# Patient Record
Sex: FEMALE | Race: WHITE | NOT HISPANIC OR LATINO | URBAN - METROPOLITAN AREA
[De-identification: names, ages, dates, MRNs, and addresses within clinical notes are randomized per-mention and may not be internally consistent; named-entity substitution may affect disease eponyms.]

---

## 2019-11-26 ENCOUNTER — OFFICE VISIT (OUTPATIENT)
Dept: ENDOCRINOLOGY | Facility: CLINIC | Age: 29
End: 2019-11-26
Payer: COMMERCIAL

## 2019-11-26 VITALS
BODY MASS INDEX: 30.73 KG/M2 | HEART RATE: 60 BPM | DIASTOLIC BLOOD PRESSURE: 60 MMHG | SYSTOLIC BLOOD PRESSURE: 110 MMHG | WEIGHT: 162.8 LBS | HEIGHT: 61 IN

## 2019-11-26 DIAGNOSIS — L68.0 HIRSUTISM: ICD-10-CM

## 2019-11-26 DIAGNOSIS — L65.9 HAIR LOSS: ICD-10-CM

## 2019-11-26 DIAGNOSIS — E28.2 PCOS (POLYCYSTIC OVARIAN SYNDROME): Primary | ICD-10-CM

## 2019-11-26 DIAGNOSIS — E06.3 HASHIMOTO'S THYROIDITIS: ICD-10-CM

## 2019-11-26 PROCEDURE — 99244 OFF/OP CNSLTJ NEW/EST MOD 40: CPT | Performed by: INTERNAL MEDICINE

## 2019-11-26 RX ORDER — SPIRONOLACTONE 100 MG/1
TABLET, FILM COATED ORAL
Refills: 0 | COMMUNITY
Start: 2019-11-19 | End: 2020-04-13 | Stop reason: SDUPTHER

## 2019-11-26 RX ORDER — NICOTINE POLACRILEX 2 MG
1 GUM BUCCAL DAILY
COMMUNITY

## 2019-11-26 RX ORDER — NORETHINDRONE AND ETHINYL ESTRADIOL 1 MG-35MCG
KIT ORAL
Refills: 0 | COMMUNITY
Start: 2019-11-05 | End: 2019-11-26

## 2019-11-26 RX ORDER — OMEGA-3S/DHA/EPA/FISH OIL/D3 300MG-1000
400 CAPSULE ORAL DAILY
COMMUNITY

## 2019-11-26 RX ORDER — CHLORAL HYDRATE 500 MG
1000 CAPSULE ORAL DAILY
COMMUNITY

## 2019-11-26 RX ORDER — LANOLIN ALCOHOL/MO/W.PET/CERES
325 CREAM (GRAM) TOPICAL
COMMUNITY
End: 2020-06-01 | Stop reason: ALTCHOICE

## 2019-11-26 RX ORDER — RIBOFLAVIN (VITAMIN B2) 100 MG
100 TABLET ORAL DAILY
COMMUNITY

## 2019-11-26 RX ORDER — SPIRONOLACTONE 50 MG/1
TABLET, FILM COATED ORAL
Refills: 0 | COMMUNITY
Start: 2019-11-19 | End: 2020-04-13 | Stop reason: SDUPTHER

## 2019-11-26 NOTE — PROGRESS NOTES
Assessment/Plan:    PCOS (polycystic ovarian syndrome)  Check total and free testosterone level  We did talk about birth control  Since she does not have a uterus, she does not need progesterone  We also discussed changing oral estradiol to patch which she is agreeable to  I prescribed estradiol patch 0 1 mg per 24 hour  Hashimoto's thyroiditis  Check TSH and free T4  Hirsutism  Continue spironolactone  Check BMP to make sure she does not have electrolyte disturbance  Hair loss  The possibilities include vitamin-D deficiency and iron deficiency  Check 25 hydroxy vitamin-D level and iron panel  Diagnoses and all orders for this visit:    PCOS (polycystic ovarian syndrome)  -     Testosterone, free, total Lab Collect; Future  -     Basic metabolic panel Lab Collect; Future  -     estradiol (CLIMARA) 0 1 mg/24 hr; Place 1 patch on the skin once a week    Hair loss  -     Vitamin D 25 hydroxy Lab Collect; Future  -     Iron Panel (Includes Ferritin, Iron Sat%, Iron, and TIBC); Future    Hashimoto's thyroiditis  -     TSH, 3rd generation Lab Collect; Future  -     T4, free Lab Collect; Future  -     Thyroid Antibodies Panel Lab Collect; Future    Hirsutism    Other orders  -     spironolactone (ALDACTONE) 100 mg tablet  -     spironolactone (ALDACTONE) 50 mg tablet  -     Discontinue: ALYACEN 1/35 1-35 MG-MCG per tablet  -     ferrous sulfate 325 (65 FE) MG EC tablet; Take 325 mg by mouth 3 (three) times a day with meals  -     Biotin 1 MG CAPS; Take by mouth  -     Ascorbic Acid (VITAMIN C) 100 MG tablet; Take 100 mg by mouth daily  -     cholecalciferol (VITAMIN D3) 400 units tablet; Take 400 Units by mouth daily  -     Omega-3 Fatty Acids (FISH OIL) 1,000 mg; Take 1,000 mg by mouth daily          Subjective:      Patient ID: Cee Jack is a 30 y o  female  64-year-old woman with polycystic ovarian syndrome diagnosed at the age of 21    At the age of 23, she was diagnosed with cervical cancer and had her cervix and uterus removed  For the PCOS, she is on birth control and spironolactone  She states that her hirsutism is controlled  Today, she does complain to me about hair loss that started about nine months ago  She is using Rogaine to help this  She was told by Dermatology around nine months ago to start taking iron  I did ask her to get me records form this  There is no family history of polycystic ovarian syndrome or infertility  The following portions of the patient's history were reviewed and updated as appropriate: allergies, current medications, past family history, past medical history, past social history, past surgical history and problem list     Review of Systems   Constitutional: Negative for chills and fever  Respiratory: Negative for shortness of breath  Cardiovascular: Negative for chest pain  Gastrointestinal: Negative for constipation, diarrhea, nausea and vomiting  All other systems reviewed and are negative  Objective:      /60   Pulse 60   Ht 5' 1" (1 549 m)   Wt 73 8 kg (162 lb 12 8 oz)   BMI 30 76 kg/m²          Physical Exam   Constitutional: She is oriented to person, place, and time  She appears well-developed and well-nourished  No distress  HENT:   Head: Normocephalic and atraumatic  Mouth/Throat: Oropharynx is clear and moist and mucous membranes are normal  No oropharyngeal exudate  Eyes: Conjunctivae, EOM and lids are normal  Right eye exhibits no discharge  Left eye exhibits no discharge  No scleral icterus  Neck: Neck supple  No thyromegaly present  Cardiovascular: Normal rate, regular rhythm and normal heart sounds  Exam reveals no gallop and no friction rub  No murmur heard  Pulmonary/Chest: Effort normal and breath sounds normal  No respiratory distress  She has no wheezes  Abdominal: Soft  Bowel sounds are normal  She exhibits no distension  There is no tenderness  Musculoskeletal: Normal range of motion  She exhibits no edema, tenderness or deformity  Lymphadenopathy:        Head (right side): No occipital adenopathy present  Head (left side): No occipital adenopathy present  Right: No supraclavicular adenopathy present  Left: No supraclavicular adenopathy present  Neurological: She is alert and oriented to person, place, and time  No cranial nerve deficit  Skin: Skin is warm and intact  No rash noted  She is not diaphoretic  No erythema  Psychiatric: She has a normal mood and affect  Her behavior is normal    Vitals reviewed

## 2019-11-26 NOTE — ASSESSMENT & PLAN NOTE
Check total and free testosterone level  We did talk about birth control  Since she does not have a uterus, she does not need progesterone  We also discussed changing oral estradiol to patch which she is agreeable to  I prescribed estradiol patch 0 1 mg per 24 hour

## 2019-11-26 NOTE — ASSESSMENT & PLAN NOTE
The possibilities include vitamin-D deficiency and iron deficiency  Check 25 hydroxy vitamin-D level and iron panel

## 2019-12-12 ENCOUNTER — LAB (OUTPATIENT)
Dept: LAB | Facility: CLINIC | Age: 29
End: 2019-12-12
Payer: COMMERCIAL

## 2019-12-12 DIAGNOSIS — E06.3 HASHIMOTO'S THYROIDITIS: ICD-10-CM

## 2019-12-12 DIAGNOSIS — E28.2 PCOS (POLYCYSTIC OVARIAN SYNDROME): ICD-10-CM

## 2019-12-12 DIAGNOSIS — L65.9 HAIR LOSS: ICD-10-CM

## 2019-12-12 LAB
25(OH)D3 SERPL-MCNC: 30.6 NG/ML (ref 30–100)
ANION GAP SERPL CALCULATED.3IONS-SCNC: 5 MMOL/L (ref 4–13)
BUN SERPL-MCNC: 12 MG/DL (ref 5–25)
CALCIUM SERPL-MCNC: 9.6 MG/DL (ref 8.3–10.1)
CHLORIDE SERPL-SCNC: 105 MMOL/L (ref 100–108)
CO2 SERPL-SCNC: 29 MMOL/L (ref 21–32)
CREAT SERPL-MCNC: 1.1 MG/DL (ref 0.6–1.3)
FERRITIN SERPL-MCNC: 163 NG/ML (ref 8–388)
GFR SERPL CREATININE-BSD FRML MDRD: 67 ML/MIN/1.73SQ M
GLUCOSE P FAST SERPL-MCNC: 91 MG/DL (ref 65–99)
IRON SATN MFR SERPL: 50 %
IRON SERPL-MCNC: 176 UG/DL (ref 50–170)
POTASSIUM SERPL-SCNC: 3.9 MMOL/L (ref 3.5–5.3)
SODIUM SERPL-SCNC: 139 MMOL/L (ref 136–145)
T4 FREE SERPL-MCNC: 0.93 NG/DL (ref 0.76–1.46)
TIBC SERPL-MCNC: 352 UG/DL (ref 250–450)
TSH SERPL DL<=0.05 MIU/L-ACNC: 1.43 UIU/ML (ref 0.36–3.74)

## 2019-12-12 PROCEDURE — 83540 ASSAY OF IRON: CPT

## 2019-12-12 PROCEDURE — 86800 THYROGLOBULIN ANTIBODY: CPT

## 2019-12-12 PROCEDURE — 82306 VITAMIN D 25 HYDROXY: CPT

## 2019-12-12 PROCEDURE — 86376 MICROSOMAL ANTIBODY EACH: CPT

## 2019-12-12 PROCEDURE — 84443 ASSAY THYROID STIM HORMONE: CPT

## 2019-12-12 PROCEDURE — 84402 ASSAY OF FREE TESTOSTERONE: CPT

## 2019-12-12 PROCEDURE — 83550 IRON BINDING TEST: CPT

## 2019-12-12 PROCEDURE — 84403 ASSAY OF TOTAL TESTOSTERONE: CPT

## 2019-12-12 PROCEDURE — 36415 COLL VENOUS BLD VENIPUNCTURE: CPT

## 2019-12-12 PROCEDURE — 82728 ASSAY OF FERRITIN: CPT

## 2019-12-12 PROCEDURE — 80048 BASIC METABOLIC PNL TOTAL CA: CPT

## 2019-12-12 PROCEDURE — 84439 ASSAY OF FREE THYROXINE: CPT

## 2019-12-13 LAB
TESTOST FREE SERPL-MCNC: 2.9 PG/ML (ref 0–4.2)
TESTOST SERPL-MCNC: 28 NG/DL (ref 8–48)
THYROGLOB AB SERPL-ACNC: <1 IU/ML (ref 0–0.9)
THYROPEROXIDASE AB SERPL-ACNC: 14 IU/ML (ref 0–34)

## 2019-12-13 NOTE — RESULT ENCOUNTER NOTE
The iron level is slightly elevated  Otherwise the testing is normal   Await testosterone levels      Sent to my chart

## 2019-12-16 ENCOUNTER — TELEPHONE (OUTPATIENT)
Dept: ENDOCRINOLOGY | Facility: CLINIC | Age: 29
End: 2019-12-16

## 2019-12-16 NOTE — TELEPHONE ENCOUNTER
----- Message from Remington Gr MD sent at 12/16/2019  8:07 AM EST -----  The laboratory testing results are normal

## 2020-04-13 DIAGNOSIS — L68.0 HIRSUTISM: ICD-10-CM

## 2020-04-13 DIAGNOSIS — E28.2 PCOS (POLYCYSTIC OVARIAN SYNDROME): Primary | ICD-10-CM

## 2020-04-13 RX ORDER — SPIRONOLACTONE 50 MG/1
50 TABLET, FILM COATED ORAL DAILY
Qty: 30 TABLET | Refills: 1 | Status: SHIPPED | OUTPATIENT
Start: 2020-04-13 | End: 2020-06-01 | Stop reason: ALTCHOICE

## 2020-04-13 RX ORDER — SPIRONOLACTONE 100 MG/1
100 TABLET, FILM COATED ORAL DAILY
Qty: 30 TABLET | Refills: 1 | Status: SHIPPED | OUTPATIENT
Start: 2020-04-13 | End: 2020-06-01 | Stop reason: SDUPTHER

## 2020-05-07 ENCOUNTER — TELEPHONE (OUTPATIENT)
Dept: ENDOCRINOLOGY | Facility: CLINIC | Age: 30
End: 2020-05-07

## 2020-05-07 DIAGNOSIS — E28.2 PCOS (POLYCYSTIC OVARIAN SYNDROME): Primary | ICD-10-CM

## 2020-05-26 ENCOUNTER — APPOINTMENT (OUTPATIENT)
Dept: LAB | Facility: CLINIC | Age: 30
End: 2020-05-26
Payer: COMMERCIAL

## 2020-05-26 ENCOUNTER — TELEPHONE (OUTPATIENT)
Dept: ENDOCRINOLOGY | Facility: CLINIC | Age: 30
End: 2020-05-26

## 2020-05-26 DIAGNOSIS — E28.2 PCOS (POLYCYSTIC OVARIAN SYNDROME): ICD-10-CM

## 2020-05-26 LAB
ALBUMIN SERPL BCP-MCNC: 3.6 G/DL (ref 3.5–5)
ALP SERPL-CCNC: 51 U/L (ref 46–116)
ALT SERPL W P-5'-P-CCNC: 24 U/L (ref 12–78)
ANION GAP SERPL CALCULATED.3IONS-SCNC: 5 MMOL/L (ref 4–13)
AST SERPL W P-5'-P-CCNC: 15 U/L (ref 5–45)
BILIRUB SERPL-MCNC: 0.45 MG/DL (ref 0.2–1)
BUN SERPL-MCNC: 14 MG/DL (ref 5–25)
CALCIUM SERPL-MCNC: 9.4 MG/DL (ref 8.3–10.1)
CHLORIDE SERPL-SCNC: 107 MMOL/L (ref 100–108)
CO2 SERPL-SCNC: 26 MMOL/L (ref 21–32)
CREAT SERPL-MCNC: 1 MG/DL (ref 0.6–1.3)
GFR SERPL CREATININE-BSD FRML MDRD: 75 ML/MIN/1.73SQ M
GLUCOSE P FAST SERPL-MCNC: 101 MG/DL (ref 65–99)
POTASSIUM SERPL-SCNC: 4.1 MMOL/L (ref 3.5–5.3)
PROT SERPL-MCNC: 6.8 G/DL (ref 6.4–8.2)
SODIUM SERPL-SCNC: 138 MMOL/L (ref 136–145)

## 2020-05-26 PROCEDURE — 80053 COMPREHEN METABOLIC PANEL: CPT

## 2020-05-26 PROCEDURE — 36415 COLL VENOUS BLD VENIPUNCTURE: CPT

## 2020-05-30 DIAGNOSIS — L68.0 HIRSUTISM: ICD-10-CM

## 2020-06-01 ENCOUNTER — TELEMEDICINE (OUTPATIENT)
Dept: ENDOCRINOLOGY | Facility: CLINIC | Age: 30
End: 2020-06-01
Payer: COMMERCIAL

## 2020-06-01 DIAGNOSIS — E83.19 IRON EXCESS: ICD-10-CM

## 2020-06-01 DIAGNOSIS — L65.9 HAIR LOSS: ICD-10-CM

## 2020-06-01 DIAGNOSIS — R73.01 IMPAIRED FASTING GLUCOSE: ICD-10-CM

## 2020-06-01 DIAGNOSIS — L68.0 HIRSUTISM: ICD-10-CM

## 2020-06-01 DIAGNOSIS — E28.2 PCOS (POLYCYSTIC OVARIAN SYNDROME): ICD-10-CM

## 2020-06-01 DIAGNOSIS — E06.3 HASHIMOTO'S THYROIDITIS: ICD-10-CM

## 2020-06-01 DIAGNOSIS — E66.9 OBESITY WITH SERIOUS COMORBIDITY, UNSPECIFIED CLASSIFICATION, UNSPECIFIED OBESITY TYPE: Primary | ICD-10-CM

## 2020-06-01 PROCEDURE — 99214 OFFICE O/P EST MOD 30 MIN: CPT | Performed by: PHYSICIAN ASSISTANT

## 2020-06-01 RX ORDER — SPIRONOLACTONE 100 MG/1
TABLET, FILM COATED ORAL
Qty: 30 TABLET | Refills: 1 | OUTPATIENT
Start: 2020-06-01

## 2020-06-01 RX ORDER — SPIRONOLACTONE 50 MG/1
TABLET, FILM COATED ORAL
Qty: 30 TABLET | Refills: 1 | OUTPATIENT
Start: 2020-06-01

## 2020-06-01 RX ORDER — SPIRONOLACTONE 100 MG/1
TABLET, FILM COATED ORAL
Qty: 180 TABLET | Refills: 1 | Status: SHIPPED | OUTPATIENT
Start: 2020-06-01 | End: 2020-12-15

## 2020-06-02 ENCOUNTER — TELEPHONE (OUTPATIENT)
Dept: ENDOCRINOLOGY | Facility: CLINIC | Age: 30
End: 2020-06-02

## 2020-06-04 PROBLEM — E66.9 OBESITY WITH SERIOUS COMORBIDITY: Status: ACTIVE | Noted: 2020-06-04

## 2020-06-04 PROBLEM — E83.19 IRON EXCESS: Status: ACTIVE | Noted: 2020-06-04

## 2020-06-04 PROBLEM — R73.01 IMPAIRED FASTING GLUCOSE: Status: ACTIVE | Noted: 2020-06-04

## 2020-08-28 ENCOUNTER — OFFICE VISIT (OUTPATIENT)
Dept: URGENT CARE | Facility: CLINIC | Age: 30
End: 2020-08-28
Payer: COMMERCIAL

## 2020-08-28 VITALS
HEIGHT: 61 IN | SYSTOLIC BLOOD PRESSURE: 110 MMHG | WEIGHT: 165 LBS | BODY MASS INDEX: 31.15 KG/M2 | HEART RATE: 92 BPM | DIASTOLIC BLOOD PRESSURE: 72 MMHG | RESPIRATION RATE: 16 BRPM | OXYGEN SATURATION: 98 % | TEMPERATURE: 99 F
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
## 2020-08-28 DIAGNOSIS — Z23 NEED FOR TETANUS BOOSTER: ICD-10-CM

## 2020-08-28 DIAGNOSIS — S71.152A DOG BITE OF LEFT THIGH, INITIAL ENCOUNTER: Primary | ICD-10-CM

## 2020-08-28 PROCEDURE — 99213 OFFICE O/P EST LOW 20 MIN: CPT | Performed by: PHYSICIAN ASSISTANT

## 2020-08-28 PROCEDURE — 90715 TDAP VACCINE 7 YRS/> IM: CPT

## 2020-08-28 PROCEDURE — 90471 IMMUNIZATION ADMIN: CPT | Performed by: PHYSICIAN ASSISTANT

## 2020-08-28 RX ORDER — AMOXICILLIN AND CLAVULANATE POTASSIUM 875; 125 MG/1; MG/1
1 TABLET, FILM COATED ORAL EVERY 12 HOURS SCHEDULED
Qty: 14 TABLET | Refills: 0 | Status: SHIPPED | OUTPATIENT
Start: 2020-08-28 | End: 2020-09-04

## 2020-08-28 NOTE — PATIENT INSTRUCTIONS
Animal Bite   AMBULATORY CARE:   Animal bite  injuries range from shallow cuts to deep, life-threatening wounds  Animal bites occur more often on the hands, arms, legs, and face  Bites from dogs and cats are the most common injuries  Common symptoms include the following:   · Cut or punctured skin     · Skin torn from your body    · Swollen or bruised skin, even if the skin is not broken  Seek care immediately if:   · You have a fever  · Your wound is red, swollen, and draining pus  · You see red streaks on the skin around the wound  · You can no longer move the bitten area  · Your heartbeat and breathing are much faster than usual     · You feel dizzy and confused  Contact your healthcare provider if:   · Your pain does not get better, even after you take pain medicine  · You have nightmares or flashbacks about the animal bite  · You have questions or concerns about your condition or care  Treatment for an animal bite  may include any of the following:  · Irrigation and debridement  may be needed to clean out your wound  Dead, damaged, or infected tissue may be cut away to help your wound heal     · Medicines:      ¨ Antibiotics  prevent or treat a bacterial infection  ¨ Prescription pain medicine  may be given  Ask how to take this medicine safely  ¨ A tetanus vaccine  may be needed to prevent tetanus  Tetanus is a life-threatening bacterial infection that affects the nerves and muscles  The bacteria can be spread through animal bites  ¨ A rabies vaccine  may be needed to prevent rabies  Rabies is a life-threatening viral infection  The virus can be spread through animal bites  · Stitches  may be needed if your wound is large and not infected  · Surgery  may be needed to repair deep injuries or severe wounds  Manage your symptoms:   · Apply antibiotic ointment as directed  This helps prevent infection in minor skin wounds   Antibiotic ointment is available without a prescription  · Keep the wound clean and covered  Wash the wound every day with soap and water or germ-killing cleanser  Ask what kinds of bandages to use  · Apply ice to your wound  Ice helps prevent tissue damage and decreases swelling and pain  Use an ice pack, or put crushed ice in a plastic bag  Cover it with a towel  Apply ice on your wound for 15 to 20 minutes every hour or as directed  · Elevate your wound  Raise your wound above the level of your heart as often as you can  This will help decrease swelling and pain  Prop your wound on pillows or blankets to keep it elevated comfortably  Prevent another animal bite:   · Learn to recognize the signs of a scared pet  Avoid quick, sudden movements  · Do not step between animals that are fighting  · Do not leave a pet alone with a young child  · Do not disturb an animal while it eats, sleeps, or cares for its young  · Do not approach an animal you do not know, especially one that is tied up or caged  · Stay away from animals that seem sick or act strangely  · Do not feed or capture wild animals  Follow up with your healthcare provider as directed:  Write down your questions so you remember to ask them during your visits  © 2017 [address] [employer] Information is for End User's use only and may not be sold, redistributed or otherwise used for commercial purposes  All illustrations and images included in CareNotes® are the copyrighted property of [employer]  or [employer]  The above information is an  only  It is not intended as medical advice for individual conditions or treatments  Talk to your doctor, nurse or pharmacist before following any medical regimen to see if it is safe and effective for you

## 2020-08-28 NOTE — PROGRESS NOTES
[facility]        NAME: Matilde Cristina is a 26 y o  female  : 1990    MRN: [id number]  DATE: 2020  TIME: 6:37 PM    Assessment and Plan   Dog bite of left thigh, initial encounter [S71 152A, W54  0XXA]  1  Dog bite of left thigh, initial encounter  amoxicillin-clavulanate (AUGMENTIN) 875-125 mg per tablet   2  Need for tetanus booster  TDAP Vaccine greater than or equal to 8yo     Patient will be started prophylactically on Augmentin due to dog bite  Patient Instructions       Follow up with PCP in 3-5 days  Proceed to  ER if symptoms worsen  Chief Complaint     Chief Complaint   Patient presents with    Dog Bite     yesterday the pt was bitten on her left inner thigh by her neighbor's dog  History of Present Illness       26-year-old female presents the clinic with bruising and abrasion to the left medial thigh from a dog bite that occurred yesterday  Patient states she was walking her dogs when the neighbor's [employer] bulldog came towards her dog and patient stepped between them to protect her pet when the neighbor's dog bit her  Patient states that the dog has attacked her  in the past while walking the dogs and states that the dog was reacting to seeing the other dogs going by  Patient states that she is not up-to-date with tetanus vaccination but states that the neighbor told her that the [employer] bulldog was currently up-to-date with vaccinations  Review of Systems   Review of Systems   Constitutional: Negative for chills, fatigue and fever  Skin: Positive for color change and wound  Negative for rash  Neurological: Negative for dizziness, weakness, light-headedness, numbness and headaches           Current Medications       Current Outpatient Medications:     Ascorbic Acid (VITAMIN C) 100 MG tablet, Take 100 mg by mouth daily, Disp: , Rfl:     Biotin 1 MG CAPS, Take by mouth, Disp: , Rfl:     cholecalciferol (VITAMIN D3) 400 units tablet, Take 400 Units by mouth daily, Disp: , Rfl:     Omega-3 Fatty Acids (FISH OIL) 1,000 mg, Take 1,000 mg by mouth daily, Disp: , Rfl:     spironolactone (ALDACTONE) 100 mg tablet, 1 tab twice per day, Disp: 180 tablet, Rfl: 1    amoxicillin-clavulanate (AUGMENTIN) 875-125 mg per tablet, Take 1 tablet by mouth every 12 (twelve) hours for 7 days, Disp: 14 tablet, Rfl: 0    Current Allergies     Allergies as of 08/28/2020    (No Known Allergies)            The following portions of the patient's history were reviewed and updated as appropriate: allergies, current medications, past family history, past medical history, past social history, past surgical history and problem list      Past Medical History:   Diagnosis Date    Alopecia     Cervical cancer ([facility] [address] )     Goiter     Hashimoto's disease     PCOS (polycystic ovarian syndrome)        Past Surgical History:   Procedure Laterality Date    PARTIAL HYSTERECTOMY      WISDOM TOOTH EXTRACTION         History reviewed  No pertinent family history  Medications have been verified  Objective   /72   Pulse 92   Temp 99 °F (37 2 °C)   Resp 16   Ht 5' 1" (1 549 m)   Wt 74 8 kg (165 lb)   SpO2 98%   BMI 31 18 kg/m²        Physical Exam     Physical Exam  Vitals signs and nursing note reviewed  Constitutional:       General: She is not in acute distress  Appearance: She is well-developed  She is not diaphoretic  HENT:      Head: Normocephalic and atraumatic  Cardiovascular:      Pulses: Normal pulses  Pulmonary:      Effort: Pulmonary effort is normal    Musculoskeletal: Normal range of motion  Left upper leg: She exhibits tenderness and laceration (superficial abrasion with bruising to area )  She exhibits no bony tenderness, no swelling, no edema and no deformity  Legs:    Skin:     General: Skin is warm and dry  Capillary Refill: Capillary refill takes less than 2 seconds        Findings: Abrasion and bruising present  Neurological:      Mental Status: She is alert and oriented to person, place, and time

## 2020-09-02 ENCOUNTER — TELEPHONE (OUTPATIENT)
Dept: ENDOCRINOLOGY | Facility: CLINIC | Age: 30
End: 2020-09-02

## 2020-09-08 ENCOUNTER — APPOINTMENT (OUTPATIENT)
Dept: LAB | Facility: CLINIC | Age: 30
End: 2020-09-08
Payer: COMMERCIAL

## 2020-09-08 DIAGNOSIS — L68.0 HIRSUTISM: ICD-10-CM

## 2020-09-08 DIAGNOSIS — E83.19 IRON EXCESS: ICD-10-CM

## 2020-09-08 DIAGNOSIS — L65.9 HAIR LOSS: ICD-10-CM

## 2020-09-08 DIAGNOSIS — E06.3 HASHIMOTO'S THYROIDITIS: ICD-10-CM

## 2020-09-08 DIAGNOSIS — R73.01 IMPAIRED FASTING GLUCOSE: ICD-10-CM

## 2020-09-08 DIAGNOSIS — E66.9 OBESITY WITH SERIOUS COMORBIDITY, UNSPECIFIED CLASSIFICATION, UNSPECIFIED OBESITY TYPE: ICD-10-CM

## 2020-09-08 DIAGNOSIS — E28.2 PCOS (POLYCYSTIC OVARIAN SYNDROME): ICD-10-CM

## 2020-09-08 LAB
ANION GAP SERPL CALCULATED.3IONS-SCNC: 8 MMOL/L (ref 4–13)
BASOPHILS # BLD AUTO: 0.07 THOUSANDS/ΜL (ref 0–0.1)
BASOPHILS NFR BLD AUTO: 1 % (ref 0–1)
BUN SERPL-MCNC: 12 MG/DL (ref 5–25)
CALCIUM SERPL-MCNC: 9.2 MG/DL (ref 8.3–10.1)
CHLORIDE SERPL-SCNC: 106 MMOL/L (ref 100–108)
CO2 SERPL-SCNC: 25 MMOL/L (ref 21–32)
CREAT SERPL-MCNC: 1.06 MG/DL (ref 0.6–1.3)
EOSINOPHIL # BLD AUTO: 0.16 THOUSAND/ΜL (ref 0–0.61)
EOSINOPHIL NFR BLD AUTO: 3 % (ref 0–6)
ERYTHROCYTE [DISTWIDTH] IN BLOOD BY AUTOMATED COUNT: 12.1 % (ref 11.6–15.1)
EST. AVERAGE GLUCOSE BLD GHB EST-MCNC: 94 MG/DL
FERRITIN SERPL-MCNC: 119 NG/ML (ref 8–388)
GFR SERPL CREATININE-BSD FRML MDRD: 70 ML/MIN/1.73SQ M
GLUCOSE P FAST SERPL-MCNC: 82 MG/DL (ref 65–99)
HBA1C MFR BLD: 4.9 %
HCT VFR BLD AUTO: 44.9 % (ref 34.8–46.1)
HGB BLD-MCNC: 15.1 G/DL (ref 11.5–15.4)
IMM GRANULOCYTES # BLD AUTO: 0.02 THOUSAND/UL (ref 0–0.2)
IMM GRANULOCYTES NFR BLD AUTO: 0 % (ref 0–2)
INSULIN SERPL-ACNC: 11 MU/L (ref 3–25)
IRON SATN MFR SERPL: 33 %
IRON SERPL-MCNC: 146 UG/DL (ref 50–170)
LYMPHOCYTES # BLD AUTO: 1.45 THOUSANDS/ΜL (ref 0.6–4.47)
LYMPHOCYTES NFR BLD AUTO: 23 % (ref 14–44)
MCH RBC QN AUTO: 30.9 PG (ref 26.8–34.3)
MCHC RBC AUTO-ENTMCNC: 33.6 G/DL (ref 31.4–37.4)
MCV RBC AUTO: 92 FL (ref 82–98)
MONOCYTES # BLD AUTO: 0.41 THOUSAND/ΜL (ref 0.17–1.22)
MONOCYTES NFR BLD AUTO: 7 % (ref 4–12)
NEUTROPHILS # BLD AUTO: 4.19 THOUSANDS/ΜL (ref 1.85–7.62)
NEUTS SEG NFR BLD AUTO: 66 % (ref 43–75)
NRBC BLD AUTO-RTO: 0 /100 WBCS
PLATELET # BLD AUTO: 296 THOUSANDS/UL (ref 149–390)
PMV BLD AUTO: 10.6 FL (ref 8.9–12.7)
POTASSIUM SERPL-SCNC: 4.6 MMOL/L (ref 3.5–5.3)
RBC # BLD AUTO: 4.89 MILLION/UL (ref 3.81–5.12)
SODIUM SERPL-SCNC: 139 MMOL/L (ref 136–145)
T4 FREE SERPL-MCNC: 0.95 NG/DL (ref 0.76–1.46)
TIBC SERPL-MCNC: 439 UG/DL (ref 250–450)
TSH SERPL DL<=0.05 MIU/L-ACNC: 2.22 UIU/ML (ref 0.36–3.74)
WBC # BLD AUTO: 6.3 THOUSAND/UL (ref 4.31–10.16)

## 2020-09-08 PROCEDURE — 83036 HEMOGLOBIN GLYCOSYLATED A1C: CPT

## 2020-09-08 PROCEDURE — 85025 COMPLETE CBC W/AUTO DIFF WBC: CPT

## 2020-09-08 PROCEDURE — 84443 ASSAY THYROID STIM HORMONE: CPT

## 2020-09-08 PROCEDURE — 82728 ASSAY OF FERRITIN: CPT

## 2020-09-08 PROCEDURE — 83550 IRON BINDING TEST: CPT

## 2020-09-08 PROCEDURE — 83525 ASSAY OF INSULIN: CPT

## 2020-09-08 PROCEDURE — 84439 ASSAY OF FREE THYROXINE: CPT

## 2020-09-08 PROCEDURE — 80048 BASIC METABOLIC PNL TOTAL CA: CPT

## 2020-09-08 PROCEDURE — 83540 ASSAY OF IRON: CPT

## 2020-09-08 PROCEDURE — 36415 COLL VENOUS BLD VENIPUNCTURE: CPT

## 2020-09-08 PROCEDURE — 84403 ASSAY OF TOTAL TESTOSTERONE: CPT

## 2020-09-08 PROCEDURE — 84402 ASSAY OF FREE TESTOSTERONE: CPT

## 2020-09-09 LAB
TESTOST FREE SERPL-MCNC: 4.2 PG/ML (ref 0–4.2)
TESTOST SERPL-MCNC: 34 NG/DL (ref 8–48)

## 2020-09-10 ENCOUNTER — TELEPHONE (OUTPATIENT)
Dept: ENDOCRINOLOGY | Facility: CLINIC | Age: 30
End: 2020-09-10

## 2020-09-10 NOTE — TELEPHONE ENCOUNTER
----- Message from Jacquie Ferguson PA-C sent at 9/9/2020  5:29 PM EDT -----  All of the lab testing is normal    I did get the food log but have not yet had a chance to review it with the dietician-- once I hear back I will give her a call

## 2020-09-15 ENCOUNTER — OFFICE VISIT (OUTPATIENT)
Dept: ENDOCRINOLOGY | Facility: CLINIC | Age: 30
End: 2020-09-15
Payer: COMMERCIAL

## 2020-09-15 VITALS
DIASTOLIC BLOOD PRESSURE: 68 MMHG | SYSTOLIC BLOOD PRESSURE: 100 MMHG | WEIGHT: 168 LBS | TEMPERATURE: 97.8 F | HEART RATE: 78 BPM | BODY MASS INDEX: 31.72 KG/M2 | HEIGHT: 61 IN

## 2020-09-15 DIAGNOSIS — E06.3 HASHIMOTO'S THYROIDITIS: ICD-10-CM

## 2020-09-15 DIAGNOSIS — L65.9 HAIR LOSS: ICD-10-CM

## 2020-09-15 DIAGNOSIS — E66.09 CLASS 1 OBESITY DUE TO EXCESS CALORIES WITH SERIOUS COMORBIDITY AND BODY MASS INDEX (BMI) OF 31.0 TO 31.9 IN ADULT: ICD-10-CM

## 2020-09-15 DIAGNOSIS — L68.0 HIRSUTISM: ICD-10-CM

## 2020-09-15 DIAGNOSIS — E28.2 PCOS (POLYCYSTIC OVARIAN SYNDROME): Primary | ICD-10-CM

## 2020-09-15 PROCEDURE — 99214 OFFICE O/P EST MOD 30 MIN: CPT | Performed by: INTERNAL MEDICINE

## 2020-09-15 RX ORDER — FERROUS SULFATE 325(65) MG
325 TABLET ORAL
COMMUNITY
End: 2021-03-19

## 2020-09-15 RX ORDER — DIPHENOXYLATE HYDROCHLORIDE AND ATROPINE SULFATE 2.5; .025 MG/1; MG/1
1 TABLET ORAL DAILY
COMMUNITY

## 2020-09-15 NOTE — PROGRESS NOTES
Assessment/Plan:    PCOS (polycystic ovarian syndrome)  This is under good control  Continue spironolactone  Hashimoto's thyroiditis  The thyroid is functioning normally  Continue to monitor over time  Hirsutism  This is stable with spironolactone  Hair loss  For the most part, the hair loss has decreased  It worsens when she has stress  Obesity with serious comorbidity  She has made significant lifestyle modifications  Diagnoses and all orders for this visit:    PCOS (polycystic ovarian syndrome)    Hirsutism    Hair loss    Class 1 obesity due to excess calories with serious comorbidity and body mass index (BMI) of 31 0 to 31 9 in adult    Hashimoto's thyroiditis    Other orders  -     multivitamin (THERAGRAN) TABS; Take 1 tablet by mouth daily  -     ferrous sulfate 325 (65 Fe) mg tablet; Take 325 mg by mouth daily with breakfast          Subjective:      Patient ID: Michela Mack is a 26 y o  female  53-year-old woman with PCOS presents for follow-up  She is now off estrogen patch and is taking spironolactone  She feels well and has no complaints  She states that her hair loss has improved  She denies any fatigue but does complain about difficulty losing weight  The hirsutism is well controlled with spironolactone  The following portions of the patient's history were reviewed and updated as appropriate: allergies, current medications, past family history, past medical history, past social history, past surgical history and problem list     Review of Systems   Constitutional: Negative for chills and fever  Respiratory: Negative for shortness of breath  Cardiovascular: Negative for chest pain  Gastrointestinal: Negative for constipation, diarrhea, nausea and vomiting  All other systems reviewed and are negative          Objective:      /68   Pulse 78   Temp 97 8 °F (36 6 °C)   Ht 5' 1" (1 549 m)   Wt 76 2 kg (168 lb)   BMI 31 74 kg/m²          Physical Exam  Vitals signs reviewed  Constitutional:       General: She is not in acute distress  Appearance: She is well-developed  She is not diaphoretic  HENT:      Head: Normocephalic and atraumatic  Eyes:      General: Lids are normal  No scleral icterus  Right eye: No discharge  Left eye: No discharge  Conjunctiva/sclera: Conjunctivae normal    Neck:      Musculoskeletal: Neck supple  Thyroid: No thyromegaly  Cardiovascular:      Rate and Rhythm: Normal rate and regular rhythm  Heart sounds: Normal heart sounds  No murmur  No friction rub  No gallop  Pulmonary:      Effort: Pulmonary effort is normal  No respiratory distress  Breath sounds: Normal breath sounds  No wheezing  Abdominal:      General: Bowel sounds are normal  There is no distension  Palpations: Abdomen is soft  Tenderness: There is no abdominal tenderness  Musculoskeletal: Normal range of motion  General: No tenderness or deformity  Lymphadenopathy:      Head:      Right side of head: No occipital adenopathy  Left side of head: No occipital adenopathy  Upper Body:      Right upper body: No supraclavicular adenopathy  Left upper body: No supraclavicular adenopathy  Skin:     General: Skin is warm  Findings: No erythema or rash  Neurological:      Mental Status: She is alert and oriented to person, place, and time  Cranial Nerves: No cranial nerve deficit     Psychiatric:         Behavior: Behavior normal

## 2020-09-16 ENCOUNTER — TELEPHONE (OUTPATIENT)
Dept: DIABETES SERVICES | Facility: CLINIC | Age: 30
End: 2020-09-16

## 2020-09-16 NOTE — TELEPHONE ENCOUNTER
Thank You for reviewing    I can call her with suggestions or if you would rather- whatever is easier  Thanks so much again

## 2020-10-08 ENCOUNTER — OFFICE VISIT (OUTPATIENT)
Dept: URGENT CARE | Facility: CLINIC | Age: 30
End: 2020-10-08
Payer: COMMERCIAL

## 2020-10-08 ENCOUNTER — APPOINTMENT (OUTPATIENT)
Dept: RADIOLOGY | Facility: CLINIC | Age: 30
End: 2020-10-08
Payer: COMMERCIAL

## 2020-10-08 VITALS
WEIGHT: 165 LBS | OXYGEN SATURATION: 99 % | SYSTOLIC BLOOD PRESSURE: 118 MMHG | BODY MASS INDEX: 31.15 KG/M2 | HEART RATE: 74 BPM | TEMPERATURE: 98.6 F | RESPIRATION RATE: 16 BRPM | HEIGHT: 61 IN | DIASTOLIC BLOOD PRESSURE: 72 MMHG

## 2020-10-08 DIAGNOSIS — S69.91XA INJURY OF RIGHT THUMB, INITIAL ENCOUNTER: ICD-10-CM

## 2020-10-08 DIAGNOSIS — S63.601A SPRAIN OF RIGHT THUMB, INITIAL ENCOUNTER: Primary | ICD-10-CM

## 2020-10-08 PROCEDURE — 29130 APPL FINGER SPLINT STATIC: CPT | Performed by: PHYSICIAN ASSISTANT

## 2020-10-08 PROCEDURE — 99213 OFFICE O/P EST LOW 20 MIN: CPT | Performed by: PHYSICIAN ASSISTANT

## 2020-10-08 PROCEDURE — 73130 X-RAY EXAM OF HAND: CPT

## 2020-12-14 DIAGNOSIS — L68.0 HIRSUTISM: ICD-10-CM

## 2020-12-15 RX ORDER — SPIRONOLACTONE 100 MG/1
TABLET, FILM COATED ORAL
Qty: 60 TABLET | Refills: 5 | Status: SHIPPED | OUTPATIENT
Start: 2020-12-15 | End: 2021-03-19 | Stop reason: SDUPTHER

## 2021-03-11 ENCOUNTER — TELEPHONE (OUTPATIENT)
Dept: ENDOCRINOLOGY | Facility: CLINIC | Age: 31
End: 2021-03-11

## 2021-03-11 DIAGNOSIS — E06.3 HASHIMOTO'S THYROIDITIS: Primary | ICD-10-CM

## 2021-03-15 ENCOUNTER — LAB (OUTPATIENT)
Dept: LAB | Facility: CLINIC | Age: 31
End: 2021-03-15
Payer: COMMERCIAL

## 2021-03-15 DIAGNOSIS — E06.3 HASHIMOTO'S THYROIDITIS: ICD-10-CM

## 2021-03-15 LAB
ALBUMIN SERPL BCP-MCNC: 3.7 G/DL (ref 3.5–5)
ALP SERPL-CCNC: 55 U/L (ref 46–116)
ALT SERPL W P-5'-P-CCNC: 18 U/L (ref 12–78)
ANION GAP SERPL CALCULATED.3IONS-SCNC: 4 MMOL/L (ref 4–13)
AST SERPL W P-5'-P-CCNC: 13 U/L (ref 5–45)
BILIRUB SERPL-MCNC: 0.48 MG/DL (ref 0.2–1)
BUN SERPL-MCNC: 18 MG/DL (ref 5–25)
CALCIUM SERPL-MCNC: 9.2 MG/DL (ref 8.3–10.1)
CHLORIDE SERPL-SCNC: 108 MMOL/L (ref 100–108)
CO2 SERPL-SCNC: 26 MMOL/L (ref 21–32)
CREAT SERPL-MCNC: 1.1 MG/DL (ref 0.6–1.3)
GFR SERPL CREATININE-BSD FRML MDRD: 67 ML/MIN/1.73SQ M
GLUCOSE P FAST SERPL-MCNC: 78 MG/DL (ref 65–99)
POTASSIUM SERPL-SCNC: 4.4 MMOL/L (ref 3.5–5.3)
PROT SERPL-MCNC: 6.9 G/DL (ref 6.4–8.2)
SODIUM SERPL-SCNC: 138 MMOL/L (ref 136–145)
T4 FREE SERPL-MCNC: 1.01 NG/DL (ref 0.76–1.46)
TSH SERPL DL<=0.05 MIU/L-ACNC: 2.33 UIU/ML (ref 0.36–3.74)

## 2021-03-15 PROCEDURE — 80053 COMPREHEN METABOLIC PANEL: CPT

## 2021-03-15 PROCEDURE — 84443 ASSAY THYROID STIM HORMONE: CPT

## 2021-03-15 PROCEDURE — 36415 COLL VENOUS BLD VENIPUNCTURE: CPT

## 2021-03-15 PROCEDURE — 84439 ASSAY OF FREE THYROXINE: CPT

## 2021-03-19 ENCOUNTER — OFFICE VISIT (OUTPATIENT)
Dept: ENDOCRINOLOGY | Facility: CLINIC | Age: 31
End: 2021-03-19
Payer: COMMERCIAL

## 2021-03-19 VITALS
BODY MASS INDEX: 26.96 KG/M2 | HEIGHT: 61 IN | HEART RATE: 56 BPM | WEIGHT: 142.8 LBS | SYSTOLIC BLOOD PRESSURE: 102 MMHG | DIASTOLIC BLOOD PRESSURE: 64 MMHG

## 2021-03-19 DIAGNOSIS — L68.0 HIRSUTISM: ICD-10-CM

## 2021-03-19 DIAGNOSIS — E06.3 HASHIMOTO'S THYROIDITIS: ICD-10-CM

## 2021-03-19 DIAGNOSIS — E28.2 PCOS (POLYCYSTIC OVARIAN SYNDROME): Primary | ICD-10-CM

## 2021-03-19 DIAGNOSIS — R73.01 IMPAIRED FASTING GLUCOSE: ICD-10-CM

## 2021-03-19 PROCEDURE — 99214 OFFICE O/P EST MOD 30 MIN: CPT | Performed by: PHYSICIAN ASSISTANT

## 2021-03-19 RX ORDER — SPIRONOLACTONE 100 MG/1
TABLET, FILM COATED ORAL
Qty: 60 TABLET | Refills: 5
Start: 2021-03-19 | End: 2021-06-24

## 2021-03-19 NOTE — PROGRESS NOTES
Established Patient Progress Note       Chief Complaint   Patient presents with    Polycystic Ovary Syndrome        History of Present Illness:     Sarah Scales is a 26 y o  female with a history of PCOS  She also has history of hysterectomy for cervical cancer, ovaries remain in place  Remains off OCP/Estrogen patch  She is taking spironolactone 100mg twice per day  She would like to reduce dose if possible, hirsutism has improved  She has been working with a dietician and has overall lose 40 pounds following a 1000 calorie per day diet  She has history of hashimoto's with normal thyroid function  Overall feeling well with no complaints but does feel her weight fluctuates significantly throughout the day       Patient Active Problem List   Diagnosis    PCOS (polycystic ovarian syndrome)    Hair loss    Hashimoto's thyroiditis    Hirsutism    Obesity with serious comorbidity    Iron excess    Impaired fasting glucose      Past Medical History:   Diagnosis Date    Alopecia     Cervical cancer ([facility] [address] )     Goiter     Hashimoto's disease     PCOS (polycystic ovarian syndrome)       Past Surgical History:   Procedure Laterality Date    PARTIAL HYSTERECTOMY      WISDOM TOOTH EXTRACTION        Family History   Family history unknown: Yes     Social History     Tobacco Use    Smoking status: Never Smoker    Smokeless tobacco: Never Used   Substance Use Topics    Alcohol use: Not Currently     Frequency: Never     Binge frequency: Never     No Known Allergies    Current Outpatient Medications:     Ascorbic Acid (VITAMIN C) 100 MG tablet, Take 100 mg by mouth daily, Disp: , Rfl:     Biotin 1 MG CAPS, Take 1 capsule by mouth daily , Disp: , Rfl:     cholecalciferol (VITAMIN D3) 400 units tablet, Take 400 Units by mouth daily, Disp: , Rfl:     multivitamin (THERAGRAN) TABS, Take 1 tablet by mouth daily, Disp: , Rfl:     Omega-3 Fatty Acids (FISH OIL) 1,000 mg, Take 1,000 mg by mouth daily, Disp: , Rfl:     spironolactone (ALDACTONE) 100 mg tablet, 1 tab daily, Disp: 60 tablet, Rfl: 5    Review of Systems   Constitutional: Negative for activity change, appetite change, chills, diaphoresis, fatigue, fever and unexpected weight change  HENT: Negative for trouble swallowing and voice change  Eyes: Negative for visual disturbance  Respiratory: Negative for shortness of breath  Cardiovascular: Negative for chest pain and palpitations  Gastrointestinal: Negative for abdominal pain, constipation and diarrhea  Endocrine: Negative for cold intolerance, heat intolerance, polydipsia, polyphagia and polyuria  Genitourinary: Negative for frequency and menstrual problem  Musculoskeletal: Negative for arthralgias and myalgias  Skin: Negative for rash  Allergic/Immunologic: Negative for food allergies  Neurological: Negative for dizziness and tremors  Hematological: Negative for adenopathy  Psychiatric/Behavioral: Negative for sleep disturbance  All other systems reviewed and are negative  Physical Exam:  Body mass index is 26 98 kg/m²  /64 (BP Location: Left arm, Patient Position: Sitting, Cuff Size: Standard)   Pulse 56   Ht 5' 1" (1 549 m)   Wt 64 8 kg (142 lb 12 8 oz)   BMI 26 98 kg/m²    Wt Readings from Last 3 Encounters:   03/19/21 64 8 kg (142 lb 12 8 oz)   10/08/20 74 8 kg (165 lb)   09/15/20 76 2 kg (168 lb)       Physical Exam  Vitals signs reviewed  Constitutional:       General: She is not in acute distress  Appearance: She is well-developed  HENT:      Head: Normocephalic and atraumatic  Eyes:      Conjunctiva/sclera: Conjunctivae normal       Pupils: Pupils are equal, round, and reactive to light  Neck:      Musculoskeletal: Normal range of motion and neck supple  Thyroid: No thyromegaly  Cardiovascular:      Rate and Rhythm: Normal rate and regular rhythm  Heart sounds: Normal heart sounds     Pulmonary:      Effort: Pulmonary effort is normal  No respiratory distress  Breath sounds: Normal breath sounds  No wheezing or rales  Abdominal:      General: Bowel sounds are normal  There is no distension  Palpations: Abdomen is soft  Tenderness: There is no abdominal tenderness  Musculoskeletal: Normal range of motion  Skin:     General: Skin is warm and dry  Neurological:      Mental Status: She is alert and oriented to person, place, and time           Labs:     Component      Latest Ref Rng & Units 5/26/2020 9/8/2020 9/8/2020           8:17 AM  8:51 AM  8:51 AM   WBC      4 31 - 10 16 Thousand/uL      Red Blood Cell Count      3 81 - 5 12 Million/uL      Hemoglobin      11 5 - 15 4 g/dL      HCT      34 8 - 46 1 %      MCV      82 - 98 fL      MCH      26 8 - 34 3 pg      MCHC      31 4 - 37 4 g/dL      RDW      11 6 - 15 1 %      MPV      8 9 - 12 7 fL      Platelet Count      460 - 390 Thousands/uL      nRBC      /100 WBCs      Neutrophils %      43 - 75 %      Immat GRANS %      0 - 2 %      Lymphocytes Relative      14 - 44 %      Monocytes Relative      4 - 12 %      Eosinophils      0 - 6 %      Basophils Relative      0 - 1 %      Absolute Neutrophils      1 85 - 7 62 Thousands/µL      Immature Grans Absolute      0 00 - 0 20 Thousand/uL      Lymphocytes Absolute      0 60 - 4 47 Thousands/µL      Absolute Monocytes      0 17 - 1 22 Thousand/µL      Absolute Eosinophils      0 00 - 0 61 Thousand/µL      Basophils Absolute      0 00 - 0 10 Thousands/µL      Sodium      136 - 145 mmol/L 138 139    Potassium      3 5 - 5 3 mmol/L 4 1 4 6    Chloride      100 - 108 mmol/L 107 106    CO2      21 - 32 mmol/L 26 25    Anion Gap      4 - 13 mmol/L 5 8    BUN      5 - 25 mg/dL 14 12    Creatinine      0 60 - 1 30 mg/dL 1 00 1 06    GLUCOSE FASTING      65 - 99 mg/dL 101 (H) 82    Calcium      8 3 - 10 1 mg/dL 9 4 9 2    AST      5 - 45 U/L 15     ALT      12 - 78 U/L 24     Alkaline Phosphatase      46 - 116 U/L 51     Total Protein      6 4 - 8 2 g/dL 6 8     Albumin      3 5 - 5 0 g/dL 3 6     TOTAL BILIRUBIN      0 20 - 1 00 mg/dL 0 45     eGFR      ml/min/1 73sq m 75 70    Iron Saturation      %      TIBC      250 - 450 ug/dL      Iron      50 - 170 ug/dL      Hemoglobin A1C      Normal 3 8-5 6%; PreDiabetic 5 7-6 4%;  Diabetic >=6 5%; Glycemic control for adults with diabetes <7 0% %      eAG, EST AVG Glucose      mg/dl      TESTOSTERONE FREE      0 0 - 4 2 pg/mL      Testosterone, Total, LC/MS      8 - 48 ng/dL      INSULIN, FASTING      3 0 - 25 0 mU/L   11 0   TSH 3RD GENERATON      0 358 - 3 740 uIU/mL      Free T4      0 76 - 1 46 ng/dL      Ferritin      8 - 388 ng/mL        Component      Latest Ref Rng & Units 9/8/2020 9/8/2020 9/8/2020           8:51 AM  8:51 AM  8:51 AM   WBC      4 31 - 10 16 Thousand/uL 6 30     Red Blood Cell Count      3 81 - 5 12 Million/uL 4 89     Hemoglobin      11 5 - 15 4 g/dL 15 1     HCT      34 8 - 46 1 % 44 9     MCV      82 - 98 fL 92     MCH      26 8 - 34 3 pg 30 9     MCHC      31 4 - 37 4 g/dL 33 6     RDW      11 6 - 15 1 % 12 1     MPV      8 9 - 12 7 fL 10 6     Platelet Count      278 - 390 Thousands/uL 296     nRBC      /100 WBCs 0     Neutrophils %      43 - 75 % 66     Immat GRANS %      0 - 2 % 0     Lymphocytes Relative      14 - 44 % 23     Monocytes Relative      4 - 12 % 7     Eosinophils      0 - 6 % 3     Basophils Relative      0 - 1 % 1     Absolute Neutrophils      1 85 - 7 62 Thousands/µL 4 19     Immature Grans Absolute      0 00 - 0 20 Thousand/uL 0 02     Lymphocytes Absolute      0 60 - 4 47 Thousands/µL 1 45     Absolute Monocytes      0 17 - 1 22 Thousand/µL 0 41     Absolute Eosinophils      0 00 - 0 61 Thousand/µL 0 16     Basophils Absolute      0 00 - 0 10 Thousands/µL 0 07     Sodium      136 - 145 mmol/L      Potassium      3 5 - 5 3 mmol/L      Chloride      100 - 108 mmol/L      CO2      21 - 32 mmol/L      Anion Gap      4 - 13 mmol/L      BUN      5 - 25 mg/dL      Creatinine      0 60 - 1 30 mg/dL      GLUCOSE FASTING      65 - 99 mg/dL      Calcium      8 3 - 10 1 mg/dL      AST      5 - 45 U/L      ALT      12 - 78 U/L      Alkaline Phosphatase      46 - 116 U/L      Total Protein      6 4 - 8 2 g/dL      Albumin      3 5 - 5 0 g/dL      TOTAL BILIRUBIN      0 20 - 1 00 mg/dL      eGFR      ml/min/1 73sq m      Iron Saturation      %      TIBC      250 - 450 ug/dL      Iron      50 - 170 ug/dL      Hemoglobin A1C      Normal 3 8-5 6%; PreDiabetic 5 7-6 4%;  Diabetic >=6 5%; Glycemic control for adults with diabetes <7 0% %      eAG, EST AVG Glucose      mg/dl      TESTOSTERONE FREE      0 0 - 4 2 pg/mL      Testosterone, Total, LC/MS      8 - 48 ng/dL      INSULIN, FASTING      3 0 - 25 0 mU/L      TSH 3RD GENERATON      0 358 - 3 740 uIU/mL  2 220    Free T4      0 76 - 1 46 ng/dL   0 95   Ferritin      8 - 388 ng/mL        Component      Latest Ref Rng & Units 9/8/2020 9/8/2020 9/8/2020           8:51 AM  8:51 AM  8:51 AM   WBC      4 31 - 10 16 Thousand/uL      Red Blood Cell Count      3 81 - 5 12 Million/uL      Hemoglobin      11 5 - 15 4 g/dL      HCT      34 8 - 46 1 %      MCV      82 - 98 fL      MCH      26 8 - 34 3 pg      MCHC      31 4 - 37 4 g/dL      RDW      11 6 - 15 1 %      MPV      8 9 - 12 7 fL      Platelet Count      371 - 390 Thousands/uL      nRBC      /100 WBCs      Neutrophils %      43 - 75 %      Immat GRANS %      0 - 2 %      Lymphocytes Relative      14 - 44 %      Monocytes Relative      4 - 12 %      Eosinophils      0 - 6 %      Basophils Relative      0 - 1 %      Absolute Neutrophils      1 85 - 7 62 Thousands/µL      Immature Grans Absolute      0 00 - 0 20 Thousand/uL      Lymphocytes Absolute      0 60 - 4 47 Thousands/µL      Absolute Monocytes      0 17 - 1 22 Thousand/µL      Absolute Eosinophils      0 00 - 0 61 Thousand/µL      Basophils Absolute      0 00 - 0 10 Thousands/µL      Sodium      136 - 145 mmol/L Potassium      3 5 - 5 3 mmol/L      Chloride      100 - 108 mmol/L      CO2      21 - 32 mmol/L      Anion Gap      4 - 13 mmol/L      BUN      5 - 25 mg/dL      Creatinine      0 60 - 1 30 mg/dL      GLUCOSE FASTING      65 - 99 mg/dL      Calcium      8 3 - 10 1 mg/dL      AST      5 - 45 U/L      ALT      12 - 78 U/L      Alkaline Phosphatase      46 - 116 U/L      Total Protein      6 4 - 8 2 g/dL      Albumin      3 5 - 5 0 g/dL      TOTAL BILIRUBIN      0 20 - 1 00 mg/dL      eGFR      ml/min/1 73sq m      Iron Saturation      % 33     TIBC      250 - 450 ug/dL 439     Iron      50 - 170 ug/dL 146     Hemoglobin A1C      Normal 3 8-5 6%; PreDiabetic 5 7-6 4%;  Diabetic >=6 5%; Glycemic control for adults with diabetes <7 0% %   4 9   eAG, EST AVG Glucose      mg/dl   94   TESTOSTERONE FREE      0 0 - 4 2 pg/mL      Testosterone, Total, LC/MS      8 - 48 ng/dL      INSULIN, FASTING      3 0 - 25 0 mU/L      TSH 3RD GENERATON      0 358 - 3 740 uIU/mL      Free T4      0 76 - 1 46 ng/dL      Ferritin      8 - 388 ng/mL  119      Component      Latest Ref Rng & Units 9/8/2020 3/15/2021 3/15/2021           8:51 AM  8:06 AM  8:06 AM   WBC      4 31 - 10 16 Thousand/uL      Red Blood Cell Count      3 81 - 5 12 Million/uL      Hemoglobin      11 5 - 15 4 g/dL      HCT      34 8 - 46 1 %      MCV      82 - 98 fL      MCH      26 8 - 34 3 pg      MCHC      31 4 - 37 4 g/dL      RDW      11 6 - 15 1 %      MPV      8 9 - 12 7 fL      Platelet Count      506 - 390 Thousands/uL      nRBC      /100 WBCs      Neutrophils %      43 - 75 %      Immat GRANS %      0 - 2 %      Lymphocytes Relative      14 - 44 %      Monocytes Relative      4 - 12 %      Eosinophils      0 - 6 %      Basophils Relative      0 - 1 %      Absolute Neutrophils      1 85 - 7 62 Thousands/µL      Immature Grans Absolute      0 00 - 0 20 Thousand/uL      Lymphocytes Absolute      0 60 - 4 47 Thousands/µL      Absolute Monocytes      0 17 - 1 22 Thousand/µL      Absolute Eosinophils      0 00 - 0 61 Thousand/µL      Basophils Absolute      0 00 - 0 10 Thousands/µL      Sodium      136 - 145 mmol/L  138    Potassium      3 5 - 5 3 mmol/L  4 4    Chloride      100 - 108 mmol/L  108    CO2      21 - 32 mmol/L  26    Anion Gap      4 - 13 mmol/L  4    BUN      5 - 25 mg/dL  18    Creatinine      0 60 - 1 30 mg/dL  1 10    GLUCOSE FASTING      65 - 99 mg/dL  78    Calcium      8 3 - 10 1 mg/dL  9 2    AST      5 - 45 U/L  13    ALT      12 - 78 U/L  18    Alkaline Phosphatase      46 - 116 U/L  55    Total Protein      6 4 - 8 2 g/dL  6 9    Albumin      3 5 - 5 0 g/dL  3 7    TOTAL BILIRUBIN      0 20 - 1 00 mg/dL  0 48    eGFR      ml/min/1 73sq m  67    Iron Saturation      %      TIBC      250 - 450 ug/dL      Iron      50 - 170 ug/dL      Hemoglobin A1C      Normal 3 8-5 6%; PreDiabetic 5 7-6 4%;  Diabetic >=6 5%; Glycemic control for adults with diabetes <7 0% %      eAG, EST AVG Glucose      mg/dl      TESTOSTERONE FREE      0 0 - 4 2 pg/mL 4 2     Testosterone, Total, LC/MS      8 - 48 ng/dL 34     INSULIN, FASTING      3 0 - 25 0 mU/L      TSH 3RD GENERATON      0 358 - 3 740 uIU/mL   2 330   Free T4      0 76 - 1 46 ng/dL      Ferritin      8 - 388 ng/mL        Component      Latest Ref Rng & Units 3/15/2021           8:06 AM   WBC      4 31 - 10 16 Thousand/uL    Red Blood Cell Count      3 81 - 5 12 Million/uL    Hemoglobin      11 5 - 15 4 g/dL    HCT      34 8 - 46 1 %    MCV      82 - 98 fL    MCH      26 8 - 34 3 pg    MCHC      31 4 - 37 4 g/dL    RDW      11 6 - 15 1 %    MPV      8 9 - 12 7 fL    Platelet Count      386 - 390 Thousands/uL    nRBC      /100 WBCs    Neutrophils %      43 - 75 %    Immat GRANS %      0 - 2 %    Lymphocytes Relative      14 - 44 %    Monocytes Relative      4 - 12 %    Eosinophils      0 - 6 %    Basophils Relative      0 - 1 %    Absolute Neutrophils      1 85 - 7 62 Thousands/µL    Immature Grans Absolute 0 00 - 0 20 Thousand/uL    Lymphocytes Absolute      0 60 - 4 47 Thousands/µL    Absolute Monocytes      0 17 - 1 22 Thousand/µL    Absolute Eosinophils      0 00 - 0 61 Thousand/µL    Basophils Absolute      0 00 - 0 10 Thousands/µL    Sodium      136 - 145 mmol/L    Potassium      3 5 - 5 3 mmol/L    Chloride      100 - 108 mmol/L    CO2      21 - 32 mmol/L    Anion Gap      4 - 13 mmol/L    BUN      5 - 25 mg/dL    Creatinine      0 60 - 1 30 mg/dL    GLUCOSE FASTING      65 - 99 mg/dL    Calcium      8 3 - 10 1 mg/dL    AST      5 - 45 U/L    ALT      12 - 78 U/L    Alkaline Phosphatase      46 - 116 U/L    Total Protein      6 4 - 8 2 g/dL    Albumin      3 5 - 5 0 g/dL    TOTAL BILIRUBIN      0 20 - 1 00 mg/dL    eGFR      ml/min/1 73sq m    Iron Saturation      %    TIBC      250 - 450 ug/dL    Iron      50 - 170 ug/dL    Hemoglobin A1C      Normal 3 8-5 6%; PreDiabetic 5 7-6 4%; Diabetic >=6 5%; Glycemic control for adults with diabetes <7 0% %    eAG, EST AVG Glucose      mg/dl    TESTOSTERONE FREE      0 0 - 4 2 pg/mL    Testosterone, Total, LC/MS      8 - 48 ng/dL    INSULIN, FASTING      3 0 - 25 0 mU/L    TSH 3RD GENERATON      0 358 - 3 740 uIU/mL    Free T4      0 76 - 1 46 ng/dL 1 01   Ferritin      8 - 388 ng/mL        Impression & Plan:    Problem List Items Addressed This Visit        Endocrine    PCOS (polycystic ovarian syndrome) - Primary     Continue lifestyle modification and weight loss  Continue spironolactone for hirsutism  Relevant Orders    Basic metabolic panel Lab Collect    Testosterone, free, total Lab Collect    Hashimoto's thyroiditis     Thyroid function remains normal  Continue to monitor  Impaired fasting glucose     Fasting glucose normal   Continue lifestyle modification and weight loss  Musculoskeletal and Integument    Hirsutism     This has improved and is stable  She would like to try to reduce dose of spironolactone    Will reduce to 100mg daily  If she notices worsening hirsutism she will let us know and will return to 100mg BID dosing  Relevant Medications    spironolactone (ALDACTONE) 100 mg tablet          Orders Placed This Encounter   Procedures    Basic metabolic panel Lab Collect     This is a patient instruction: Patient fasting for 8 hours or longer recommended  Standing Status:   Future     Standing Expiration Date:   3/19/2022    Testosterone, free, total Lab Collect     This is a patient instruction: Fasting preferred  Collections for men not undergoing treatment must be completed between 7am-9am ONLY  Collection time restrictions are not applicable to women or men already undergoing treatment  Standing Status:   Future     Standing Expiration Date:   3/19/2022       There are no Patient Instructions on file for this visit  Discussed with the patient and all questioned fully answered  She will call me if any problems arise  Follow-up appointment in 6 months       Counseled patient on diagnostic results, prognosis, risk and benefit of treatment options, instruction for management, importance of treatment compliance, Risk  factor reduction and impressions      Esperanza Gannon PA-C

## 2021-03-19 NOTE — ASSESSMENT & PLAN NOTE
This has improved and is stable  She would like to try to reduce dose of spironolactone  Will reduce to 100mg daily  If she notices worsening hirsutism she will let us know and will return to 100mg BID dosing

## 2021-08-31 ENCOUNTER — APPOINTMENT (OUTPATIENT)
Dept: LAB | Facility: CLINIC | Age: 31
End: 2021-08-31
Payer: COMMERCIAL

## 2021-08-31 DIAGNOSIS — E28.2 PCOS (POLYCYSTIC OVARIAN SYNDROME): ICD-10-CM

## 2021-08-31 LAB
ANION GAP SERPL CALCULATED.3IONS-SCNC: 5 MMOL/L (ref 4–13)
BUN SERPL-MCNC: 18 MG/DL (ref 5–25)
CALCIUM SERPL-MCNC: 8.9 MG/DL (ref 8.3–10.1)
CHLORIDE SERPL-SCNC: 107 MMOL/L (ref 100–108)
CO2 SERPL-SCNC: 27 MMOL/L (ref 21–32)
CREAT SERPL-MCNC: 0.99 MG/DL (ref 0.6–1.3)
GFR SERPL CREATININE-BSD FRML MDRD: 75 ML/MIN/1.73SQ M
GLUCOSE P FAST SERPL-MCNC: 87 MG/DL (ref 65–99)
POTASSIUM SERPL-SCNC: 4 MMOL/L (ref 3.5–5.3)
SODIUM SERPL-SCNC: 139 MMOL/L (ref 136–145)

## 2021-08-31 PROCEDURE — 80048 BASIC METABOLIC PNL TOTAL CA: CPT

## 2021-08-31 PROCEDURE — 36415 COLL VENOUS BLD VENIPUNCTURE: CPT

## 2021-08-31 PROCEDURE — 84402 ASSAY OF FREE TESTOSTERONE: CPT

## 2021-08-31 PROCEDURE — 84403 ASSAY OF TOTAL TESTOSTERONE: CPT

## 2021-09-01 LAB
TESTOST FREE SERPL-MCNC: 4.4 PG/ML (ref 0–4.2)
TESTOST SERPL-MCNC: 18 NG/DL (ref 8–60)

## 2021-09-10 ENCOUNTER — OFFICE VISIT (OUTPATIENT)
Dept: ENDOCRINOLOGY | Facility: CLINIC | Age: 31
End: 2021-09-10
Payer: COMMERCIAL

## 2021-09-10 VITALS
DIASTOLIC BLOOD PRESSURE: 70 MMHG | HEIGHT: 61 IN | SYSTOLIC BLOOD PRESSURE: 116 MMHG | HEART RATE: 52 BPM | WEIGHT: 128.4 LBS | BODY MASS INDEX: 24.24 KG/M2

## 2021-09-10 DIAGNOSIS — L68.0 HIRSUTISM: ICD-10-CM

## 2021-09-10 DIAGNOSIS — E28.2 PCOS (POLYCYSTIC OVARIAN SYNDROME): Primary | ICD-10-CM

## 2021-09-10 PROBLEM — E66.9 OBESITY WITH SERIOUS COMORBIDITY: Status: RESOLVED | Noted: 2020-06-04 | Resolved: 2021-09-10

## 2021-09-10 PROCEDURE — 99214 OFFICE O/P EST MOD 30 MIN: CPT | Performed by: INTERNAL MEDICINE

## 2021-09-10 RX ORDER — SPIRONOLACTONE 50 MG/1
TABLET, FILM COATED ORAL
Qty: 90 TABLET | Refills: 5 | Status: SHIPPED | OUTPATIENT
Start: 2021-09-10 | End: 2021-10-09

## 2021-09-10 NOTE — PROGRESS NOTES
Assessment/Plan:    PCOS (polycystic ovarian syndrome)   The free testosterone will has increased slightly  She is on spironolactone 100 mg once a day  In the past, she was on higher doses  Today, she would like to increase her spironolactone by 50 mg to see if the  Testosterone level will decrease  I think this is reasonable  Check total and free testosterone level in about 3 months  Since we are increasing the spironolactone, I have ordered a BMP to be done in about 10 days  Diagnoses and all orders for this visit:    PCOS (polycystic ovarian syndrome)  -     Testosterone, free, total Lab Collect; Future  -     Basic metabolic panel Lab Collect; Future    Hirsutism  -     spironolactone (ALDACTONE) 50 mg tablet; 2 tabs in the morning and 1 tab in the evening  Subjective:      Patient ID: Yang Miller is a 33 y o  female  78-year-old woman with polycystic ovarian syndrome presents for follow-up  She is now off estradiol  She does not have menstrual cycles due to partial hysterectomy  She denies worsening of acne and hirsutism  She is currently on spironolactone once a day at 100 mg dose  The following portions of the patient's history were reviewed and updated as appropriate: allergies, current medications, past family history, past medical history, past social history, past surgical history and problem list     Review of Systems   Constitutional: Negative for chills and fever  Respiratory: Negative for shortness of breath  Cardiovascular: Negative for chest pain  Gastrointestinal: Negative for constipation, diarrhea, nausea and vomiting  All other systems reviewed and are negative  Objective:      /70   Pulse (!) 52   Ht 5' 1" (1 549 m)   Wt 58 2 kg (128 lb 6 4 oz)   BMI 24 26 kg/m²          Physical Exam  Vitals reviewed  Constitutional:       General: She is not in acute distress  Appearance: She is well-developed  She is not diaphoretic  HENT:      Head: Normocephalic and atraumatic  Eyes:      General: Lids are normal  No scleral icterus  Right eye: No discharge  Left eye: No discharge  Conjunctiva/sclera: Conjunctivae normal    Neck:      Thyroid: No thyromegaly  Cardiovascular:      Rate and Rhythm: Normal rate and regular rhythm  Heart sounds: Normal heart sounds  No murmur heard  No friction rub  No gallop  Pulmonary:      Effort: Pulmonary effort is normal  No respiratory distress  Breath sounds: Normal breath sounds  No wheezing  Abdominal:      General: Bowel sounds are normal  There is no distension  Palpations: Abdomen is soft  Tenderness: There is no abdominal tenderness  Musculoskeletal:         General: No tenderness or deformity  Normal range of motion  Cervical back: Neck supple  Lymphadenopathy:      Head:      Right side of head: No occipital adenopathy  Left side of head: No occipital adenopathy  Upper Body:      Right upper body: No supraclavicular adenopathy  Left upper body: No supraclavicular adenopathy  Skin:     General: Skin is warm  Findings: No erythema or rash  Neurological:      Mental Status: She is alert and oriented to person, place, and time  Cranial Nerves: No cranial nerve deficit     Psychiatric:         Mood and Affect: Mood normal          Behavior: Behavior normal

## 2021-09-10 NOTE — ASSESSMENT & PLAN NOTE
The free testosterone will has increased slightly  She is on spironolactone 100 mg once a day  In the past, she was on higher doses  Today, she would like to increase her spironolactone by 50 mg to see if the  Testosterone level will decrease  I think this is reasonable  Check total and free testosterone level in about 3 months  Since we are increasing the spironolactone, I have ordered a BMP to be done in about 10 days

## 2021-10-09 DIAGNOSIS — L68.0 HIRSUTISM: ICD-10-CM

## 2021-10-09 RX ORDER — SPIRONOLACTONE 50 MG/1
TABLET, FILM COATED ORAL
Qty: 270 TABLET | Refills: 2 | Status: SHIPPED | OUTPATIENT
Start: 2021-10-09 | End: 2022-03-14 | Stop reason: SDUPTHER

## 2021-12-09 ENCOUNTER — LAB (OUTPATIENT)
Dept: LAB | Facility: CLINIC | Age: 31
End: 2021-12-09
Payer: COMMERCIAL

## 2021-12-09 DIAGNOSIS — E28.2 PCOS (POLYCYSTIC OVARIAN SYNDROME): ICD-10-CM

## 2021-12-09 LAB
ANION GAP SERPL CALCULATED.3IONS-SCNC: 5 MMOL/L (ref 4–13)
BUN SERPL-MCNC: 16 MG/DL (ref 5–25)
CALCIUM SERPL-MCNC: 8.9 MG/DL (ref 8.3–10.1)
CHLORIDE SERPL-SCNC: 106 MMOL/L (ref 100–108)
CO2 SERPL-SCNC: 28 MMOL/L (ref 21–32)
CREAT SERPL-MCNC: 1.15 MG/DL (ref 0.6–1.3)
GFR SERPL CREATININE-BSD FRML MDRD: 63 ML/MIN/1.73SQ M
GLUCOSE P FAST SERPL-MCNC: 81 MG/DL (ref 65–99)
POTASSIUM SERPL-SCNC: 3.9 MMOL/L (ref 3.5–5.3)
SODIUM SERPL-SCNC: 139 MMOL/L (ref 136–145)

## 2021-12-09 PROCEDURE — 84403 ASSAY OF TOTAL TESTOSTERONE: CPT

## 2021-12-09 PROCEDURE — 36415 COLL VENOUS BLD VENIPUNCTURE: CPT

## 2021-12-09 PROCEDURE — 84402 ASSAY OF FREE TESTOSTERONE: CPT

## 2021-12-09 PROCEDURE — 80048 BASIC METABOLIC PNL TOTAL CA: CPT

## 2021-12-11 LAB
TESTOST FREE SERPL-MCNC: 4.3 PG/ML (ref 0–4.2)
TESTOST SERPL-MCNC: 34 NG/DL (ref 8–60)

## 2021-12-13 ENCOUNTER — TELEPHONE (OUTPATIENT)
Dept: ENDOCRINOLOGY | Facility: CLINIC | Age: 31
End: 2021-12-13

## 2022-01-05 ENCOUNTER — TELEPHONE (OUTPATIENT)
Dept: ENDOCRINOLOGY | Facility: CLINIC | Age: 32
End: 2022-01-05

## 2022-01-05 DIAGNOSIS — E06.3 HASHIMOTO'S THYROIDITIS: Primary | ICD-10-CM

## 2022-01-05 NOTE — TELEPHONE ENCOUNTER
Patient states that she is experiencing bad acne and her testosterone free level is flagged as high, can she increase her  Aldactone to 100mg in the am and 100mg in the pm?

## 2022-02-04 ENCOUNTER — LAB (OUTPATIENT)
Dept: LAB | Facility: CLINIC | Age: 32
End: 2022-02-04
Payer: COMMERCIAL

## 2022-02-04 DIAGNOSIS — E06.3 HASHIMOTO'S THYROIDITIS: ICD-10-CM

## 2022-02-04 LAB
ANION GAP SERPL CALCULATED.3IONS-SCNC: 6 MMOL/L (ref 4–13)
BUN SERPL-MCNC: 14 MG/DL (ref 5–25)
CALCIUM SERPL-MCNC: 8.8 MG/DL (ref 8.3–10.1)
CHLORIDE SERPL-SCNC: 109 MMOL/L (ref 100–108)
CO2 SERPL-SCNC: 26 MMOL/L (ref 21–32)
CREAT SERPL-MCNC: 0.98 MG/DL (ref 0.6–1.3)
GFR SERPL CREATININE-BSD FRML MDRD: 76 ML/MIN/1.73SQ M
GLUCOSE P FAST SERPL-MCNC: 81 MG/DL (ref 65–99)
POTASSIUM SERPL-SCNC: 4.6 MMOL/L (ref 3.5–5.3)
SODIUM SERPL-SCNC: 141 MMOL/L (ref 136–145)

## 2022-02-04 PROCEDURE — 36415 COLL VENOUS BLD VENIPUNCTURE: CPT

## 2022-02-04 PROCEDURE — 80048 BASIC METABOLIC PNL TOTAL CA: CPT

## 2022-02-07 ENCOUNTER — TELEPHONE (OUTPATIENT)
Dept: ENDOCRINOLOGY | Facility: CLINIC | Age: 32
End: 2022-02-07

## 2022-02-07 NOTE — TELEPHONE ENCOUNTER
----- Message from Pavithra Russell MD sent at 2/7/2022  9:01 AM EST -----  Chloride is slightly elevated  We can follow this over time

## 2022-03-14 ENCOUNTER — OFFICE VISIT (OUTPATIENT)
Dept: ENDOCRINOLOGY | Facility: CLINIC | Age: 32
End: 2022-03-14
Payer: COMMERCIAL

## 2022-03-14 VITALS
BODY MASS INDEX: 24.26 KG/M2 | HEIGHT: 61 IN | SYSTOLIC BLOOD PRESSURE: 92 MMHG | DIASTOLIC BLOOD PRESSURE: 60 MMHG | HEART RATE: 62 BPM

## 2022-03-14 DIAGNOSIS — R73.01 IMPAIRED FASTING GLUCOSE: ICD-10-CM

## 2022-03-14 DIAGNOSIS — E06.3 HASHIMOTO'S THYROIDITIS: ICD-10-CM

## 2022-03-14 DIAGNOSIS — L68.0 HIRSUTISM: ICD-10-CM

## 2022-03-14 DIAGNOSIS — E28.2 PCOS (POLYCYSTIC OVARIAN SYNDROME): Primary | ICD-10-CM

## 2022-03-14 PROCEDURE — 99214 OFFICE O/P EST MOD 30 MIN: CPT | Performed by: PHYSICIAN ASSISTANT

## 2022-03-14 RX ORDER — SPIRONOLACTONE 50 MG/1
TABLET, FILM COATED ORAL
Qty: 360 TABLET | Refills: 3 | Status: SHIPPED | OUTPATIENT
Start: 2022-03-14

## 2022-03-14 NOTE — PROGRESS NOTES
Established Patient Progress Note       Chief Complaint   Patient presents with    Polycystic Ovary Syndrome        History of Present Illness:     Cedric Walker is a 33 y o  female with a history of  PCOS     She also has history of hysterectomy for cervical cancer, ovaries remain in place  Remains off OCP/Estrogen patch for the past few years as she did not find it to be helpful  She is taking spironolactone 100mg twice per day  She does report acne which is new over the past few months and also hair growth on the face that she treats with plucking with a tweezer        Patient Active Problem List   Diagnosis    PCOS (polycystic ovarian syndrome)    Hair loss    Hashimoto's thyroiditis    Hirsutism    Iron excess    Impaired fasting glucose      Past Medical History:   Diagnosis Date    Alopecia     [facility] ([facility] [address] [facility]     Goiter     Hashimoto's disease     [facility]anoma ([facility] [address] [facility]     Obesity with serious comorbidity 6/4/2020    PCOS (polycystic ovarian syndrome)       Past Surgical History:   Procedure Laterality Date    PARTIAL HYSTERECTOMY      SKIN SURGERY      melanoma resection ear    WISDOM TOOTH EXTRACTION        Family History   Family history unknown: Yes     Social History     Tobacco Use    Smoking status: Never Smoker    Smokeless tobacco: Never Used   Substance Use Topics    Alcohol use: Not Currently     No Known Allergies    Current Outpatient Medications:     Ascorbic Acid (VITAMIN C) 100 MG tablet, Take 100 mg by mouth daily, Disp: , Rfl:     Biotin 1 MG CAPS, Take 1 capsule by mouth daily , Disp: , Rfl:     cholecalciferol (VITAMIN D3) 400 units tablet, Take 400 Units by mouth daily, Disp: , Rfl:     multivitamin (THERAGRAN) TABS, Take 1 tablet by mouth daily, Disp: , Rfl:     Omega-3 Fatty Acids (FISH OIL) 1,000 mg, Take 1,000 mg by mouth daily, Disp: , Rfl:     spironolactone (ALDACTONE) 50 mg tablet, TAKE 2 TABS IN THE MORNING AND 2 TAB IN THE EVENING , Disp: 360 tablet, Rfl: 3    Review of Systems   Constitutional: Negative for activity change, appetite change, chills, diaphoresis, fatigue, fever and unexpected weight change  HENT: Negative for trouble swallowing and voice change  Eyes: Negative for visual disturbance  Respiratory: Negative for shortness of breath  Cardiovascular: Negative for chest pain and palpitations  Gastrointestinal: Negative for abdominal pain, constipation and diarrhea  Endocrine: Negative for cold intolerance, heat intolerance, polydipsia, polyphagia and polyuria  Genitourinary: Negative for frequency and menstrual problem  Musculoskeletal: Negative for arthralgias and myalgias  Skin: Negative for rash  Allergic/Immunologic: Negative for food allergies  Neurological: Negative for dizziness and tremors  Hematological: Negative for adenopathy  Psychiatric/Behavioral: Negative for sleep disturbance  All other systems reviewed and are negative  Physical Exam:  Body mass index is 24 26 kg/m²  BP 92/60 (BP Location: Right arm, Patient Position: Sitting, Cuff Size: Adult)   Pulse 62   Ht 5' 1" (1 549 m)   BMI 24 26 kg/m²    Wt Readings from Last 3 Encounters:   09/10/21 58 2 kg (128 lb 6 4 oz)   03/19/21 64 8 kg (142 lb 12 8 oz)   10/08/20 74 8 kg (165 lb)       Physical Exam  Vitals reviewed  Constitutional:       General: She is not in acute distress  Appearance: She is well-developed  HENT:      Head: Normocephalic and atraumatic  Eyes:      Conjunctiva/sclera: Conjunctivae normal       Pupils: Pupils are equal, round, and reactive to light  Neck:      Thyroid: No thyromegaly  Cardiovascular:      Rate and Rhythm: Normal rate and regular rhythm  Heart sounds: Normal heart sounds  Pulmonary:      Effort: Pulmonary effort is normal  No respiratory distress  Breath sounds: Normal breath sounds  No wheezing or rales  Abdominal:      General: Bowel sounds are normal  There is no distension  Palpations: Abdomen is soft  Tenderness: There is no abdominal tenderness  Musculoskeletal:         General: Normal range of motion  Cervical back: Normal range of motion and neck supple  Skin:     General: Skin is warm and dry  Comments: No visible dark hair growth or acne on the face   Neurological:      Mental Status: She is alert and oriented to person, place, and time  Labs:   Component      Latest Ref Rng & Units 9/8/2020 3/15/2021 3/15/2021           8:51 AM  8:06 AM  8:06 AM   Sodium      136 - 145 mmol/L  138    Potassium      3 5 - 5 3 mmol/L  4 4    Chloride      100 - 108 mmol/L  108    CO2      21 - 32 mmol/L  26    Anion Gap      4 - 13 mmol/L  4    BUN      5 - 25 mg/dL  18    Creatinine      0 60 - 1 30 mg/dL  1 10    GLUCOSE FASTING      65 - 99 mg/dL  78    Calcium      8 3 - 10 1 mg/dL  9 2    AST      5 - 45 U/L  13    ALT      12 - 78 U/L  18    Alkaline Phosphatase      46 - 116 U/L  55    Total Protein      6 4 - 8 2 g/dL  6 9    Albumin      3 5 - 5 0 g/dL  3 7    TOTAL BILIRUBIN      0 20 - 1 00 mg/dL  0 48    eGFR      ml/min/1 73sq m  67    Hemoglobin A1C      Normal 3 8-5 6%; PreDiabetic 5 7-6 4%;  Diabetic >=6 5%; Glycemic control for adults with diabetes <7 0% % 4 9     eAG, EST AVG Glucose      mg/dl 94     TESTOSTERONE FREE      0 0 - 4 2 pg/mL      Testosterone, Total, LC/MS      8 - 60 ng/dL      TSH 3RD GENERATON      0 358 - 3 740 uIU/mL   2 330   Free T4      0 76 - 1 46 ng/dL        Component      Latest Ref Rng & Units 3/15/2021 12/9/2021 12/9/2021           8:06 AM  7:47 AM  7:47 AM   Sodium      136 - 145 mmol/L  139    Potassium      3 5 - 5 3 mmol/L  3 9    Chloride      100 - 108 mmol/L  106    CO2      21 - 32 mmol/L  28    Anion Gap      4 - 13 mmol/L  5    BUN      5 - 25 mg/dL  16    Creatinine      0 60 - 1 30 mg/dL  1 15    GLUCOSE FASTING      65 - 99 mg/dL  81    Calcium      8 3 - 10 1 mg/dL  8 9    AST      5 - 45 U/L      ALT 12 - 78 U/L      Alkaline Phosphatase      46 - 116 U/L      Total Protein      6 4 - 8 2 g/dL      Albumin      3 5 - 5 0 g/dL      TOTAL BILIRUBIN      0 20 - 1 00 mg/dL      eGFR      ml/min/1 73sq m  63    Hemoglobin A1C      Normal 3 8-5 6%; PreDiabetic 5 7-6 4%; Diabetic >=6 5%; Glycemic control for adults with diabetes <7 0% %      eAG, EST AVG Glucose      mg/dl      TESTOSTERONE FREE      0 0 - 4 2 pg/mL   4 3 (H)   Testosterone, Total, LC/MS      8 - 60 ng/dL   34   TSH 3RD GENERATON      0 358 - 3 740 uIU/mL      Free T4      0 76 - 1 46 ng/dL 1 01       Component      Latest Ref Rng & Units 2/4/2022           7:47 AM   Sodium      136 - 145 mmol/L 141   Potassium      3 5 - 5 3 mmol/L 4 6   Chloride      100 - 108 mmol/L 109 (H)   CO2      21 - 32 mmol/L 26   Anion Gap      4 - 13 mmol/L 6   BUN      5 - 25 mg/dL 14   Creatinine      0 60 - 1 30 mg/dL 0 98   GLUCOSE FASTING      65 - 99 mg/dL 81   Calcium      8 3 - 10 1 mg/dL 8 8   AST      5 - 45 U/L    ALT      12 - 78 U/L    Alkaline Phosphatase      46 - 116 U/L    Total Protein      6 4 - 8 2 g/dL    Albumin      3 5 - 5 0 g/dL    TOTAL BILIRUBIN      0 20 - 1 00 mg/dL    eGFR      ml/min/1 73sq m 76   Hemoglobin A1C      Normal 3 8-5 6%; PreDiabetic 5 7-6 4%; Diabetic >=6 5%; Glycemic control for adults with diabetes <7 0% %    eAG, EST AVG Glucose      mg/dl    TESTOSTERONE FREE      0 0 - 4 2 pg/mL    Testosterone, Total, LC/MS      8 - 60 ng/dL    TSH 3RD GENERATON      0 358 - 3 740 uIU/mL    Free T4      0 76 - 1 46 ng/dL          Impression & Plan:    Problem List Items Addressed This Visit        Endocrine    PCOS (polycystic ovarian syndrome) - Primary     Continue spironolactone  Declines trial OCP   She will discuss other options with dermatologist           Relevant Orders    Basic metabolic panel    Testosterone, free, total    Hashimoto's thyroiditis     Thyroid function and antibodies were normal           Impaired fasting glucose Fasting glucose normal              Musculoskeletal and Integument    Hirsutism     Continue Spironolactone and use of tweezer  She will discuss other options with her dermatologist           Relevant Medications    spironolactone (ALDACTONE) 50 mg tablet    Other Relevant Orders    Basic metabolic panel    Testosterone, free, total          Orders Placed This Encounter   Procedures    Basic metabolic panel     This is a patient instruction: Patient fasting for 8 hours or longer recommended  Standing Status:   Future     Standing Expiration Date:   3/14/2023    Testosterone, free, total     This is a patient instruction: Fasting preferred  Collections for men not undergoing treatment must be completed between 7am-9am ONLY  Collection time restrictions are not applicable to women or men already undergoing treatment  Standing Status:   Future     Standing Expiration Date:   3/14/2023       There are no Patient Instructions on file for this visit  Discussed with the patient and all questioned fully answered  She will call me if any problems arise  Follow-up appointment in 6 months       Counseled patient on diagnostic results, prognosis, risk and benefit of treatment options, instruction for management, importance of treatment compliance, Risk  factor reduction and impressions      Bee Villanueva PA-C

## 2022-03-14 NOTE — ASSESSMENT & PLAN NOTE
Continue Spironolactone and use of tweezer    She will discuss other options with her dermatologist

## 2022-09-09 ENCOUNTER — TELEPHONE (OUTPATIENT)
Dept: OTHER | Facility: OTHER | Age: 32
End: 2022-09-09

## 2022-09-09 NOTE — TELEPHONE ENCOUNTER
Patient is calling regarding cancelling an appointment      Date/Time: 9/16/22 @ 8:10am    Patient was rescheduled: YES [] NO [x]    Patient requesting call back to reschedule: YES [x] NO []

## 2022-11-07 ENCOUNTER — HOSPITAL ENCOUNTER (OUTPATIENT)
Dept: CT IMAGING | Facility: HOSPITAL | Age: 32
Discharge: HOME | End: 2022-11-07

## 2022-11-07 DIAGNOSIS — H92.02 OTALGIA, LEFT EAR: ICD-10-CM

## 2022-11-08 ENCOUNTER — APPOINTMENT (OUTPATIENT)
Dept: LAB | Facility: CLINIC | Age: 32
End: 2022-11-08

## 2022-11-08 DIAGNOSIS — E28.2 PCOS (POLYCYSTIC OVARIAN SYNDROME): ICD-10-CM

## 2022-11-08 DIAGNOSIS — L68.0 HIRSUTISM: ICD-10-CM

## 2022-11-08 LAB
ANION GAP SERPL CALCULATED.3IONS-SCNC: 4 MMOL/L (ref 4–13)
BUN SERPL-MCNC: 14 MG/DL (ref 5–25)
CALCIUM SERPL-MCNC: 9.2 MG/DL (ref 8.3–10.1)
CHLORIDE SERPL-SCNC: 108 MMOL/L (ref 96–108)
CO2 SERPL-SCNC: 26 MMOL/L (ref 21–32)
CREAT SERPL-MCNC: 1.02 MG/DL (ref 0.6–1.3)
GFR SERPL CREATININE-BSD FRML MDRD: 71 ML/MIN/1.73SQ M
GLUCOSE P FAST SERPL-MCNC: 93 MG/DL (ref 65–99)
POTASSIUM SERPL-SCNC: 4.4 MMOL/L (ref 3.5–5.3)
SODIUM SERPL-SCNC: 138 MMOL/L (ref 135–147)

## 2022-11-09 LAB
TESTOST FREE SERPL-MCNC: 3.1 PG/ML (ref 0–4.2)
TESTOST SERPL-MCNC: 22 NG/DL (ref 8–60)

## 2022-11-14 ENCOUNTER — OFFICE VISIT (OUTPATIENT)
Dept: ENDOCRINOLOGY | Facility: CLINIC | Age: 32
End: 2022-11-14

## 2022-11-14 VITALS
HEART RATE: 67 BPM | BODY MASS INDEX: 24.26 KG/M2 | DIASTOLIC BLOOD PRESSURE: 60 MMHG | OXYGEN SATURATION: 98 % | SYSTOLIC BLOOD PRESSURE: 100 MMHG | HEIGHT: 61 IN

## 2022-11-14 DIAGNOSIS — E06.3 HASHIMOTO'S THYROIDITIS: ICD-10-CM

## 2022-11-14 DIAGNOSIS — E28.2 PCOS (POLYCYSTIC OVARIAN SYNDROME): Primary | ICD-10-CM

## 2022-11-14 DIAGNOSIS — L68.0 HIRSUTISM: ICD-10-CM

## 2022-11-14 DIAGNOSIS — F32.A DEPRESSION, UNSPECIFIED DEPRESSION TYPE: ICD-10-CM

## 2022-11-14 DIAGNOSIS — R73.01 IMPAIRED FASTING GLUCOSE: ICD-10-CM

## 2022-11-14 PROBLEM — E83.19 IRON EXCESS: Status: RESOLVED | Noted: 2020-06-04 | Resolved: 2022-11-14

## 2022-11-14 RX ORDER — SPIRONOLACTONE 50 MG/1
TABLET, FILM COATED ORAL
Qty: 360 TABLET | Refills: 3 | Status: SHIPPED | OUTPATIENT
Start: 2022-11-14

## 2022-11-14 NOTE — PROGRESS NOTES
Established Patient Progress Note       Chief Complaint   Patient presents with   • Polycystic Ovary Syndrome        History of Present Illness:     Jose Oden is a 27 y o  female with a history of PCOS     She also has history of hysterectomy for cervical cancer, ovaries remain in place    Remains off OCP/Estrogen patch for the past few years as she did not find it to be helpful  She is taking spironolactone 100mg twice per day  She denies any symptoms of lightheaded or dizziness  Since the last visit, acne has improved  Hair loss has improved and hair growth on the face has improved  She does report depression and has seen therapist in the past but feels she needs medication   She does not have PCP or psychiatrist          Patient Active Problem List   Diagnosis   • PCOS (polycystic ovarian syndrome)   • Hair loss   • Hashimoto's thyroiditis   • Hirsutism   • Impaired fasting glucose   • Depression      Past Medical History:   Diagnosis Date   • Alopecia    • Cerv[facility] ([facility] )    • Goiter    • Hashimoto's disease    • Melanoma ([facility] )    • Obesity with serious comorbidity 6/4/2020   • PCOS (polycystic ovarian syndrome)       Past Surgical History:   Procedure Laterality Date   • PARTIAL HYSTERECTOMY     • SKIN SURGERY      melanoma resection ear   • WISDOM TOOTH EXTRACTION        Family History   Family history unknown: Yes     Social History     Tobacco Use   • Smoking status: Never Smoker   • Smokeless tobacco: Never Used   Substance Use Topics   • Alcohol use: Not Currently     No Known Allergies    Current Outpatient Medications:   •  Ascorbic Acid (VITAMIN C) 100 MG tablet, Take 100 mg by mouth daily, Disp: , Rfl:   •  Biotin 1 MG CAPS, Take 1 capsule by mouth daily , Disp: , Rfl:   •  cholecalciferol (VITAMIN D3) 400 units tablet, Take 400 Units by mouth daily, Disp: , Rfl:   •  multivitamin (THERAGRAN) TABS, Take 1 tablet by mouth daily, Disp: , Rfl:   •  Omega-3 Fatty Acids (FISH OIL) 1,000 mg, Take 1,000 mg by mouth daily, Disp: , Rfl:   •  spironolactone (ALDACTONE) 50 mg tablet, TAKE 2 TABS IN THE MORNING AND 2 TAB IN THE EVENING , Disp: 360 tablet, Rfl: 3    Review of Systems   Constitutional: Negative for activity change, appetite change, chills, diaphoresis, fatigue, fever and unexpected weight change  HENT: Negative for trouble swallowing and voice change  Eyes: Negative for visual disturbance  Respiratory: Negative for shortness of breath  Cardiovascular: Negative for chest pain and palpitations  Gastrointestinal: Negative for abdominal pain, constipation and diarrhea  Endocrine: Negative for cold intolerance, heat intolerance, polydipsia, polyphagia and polyuria  Genitourinary: Negative for frequency and menstrual problem  Musculoskeletal: Negative for arthralgias and myalgias  Skin: Negative for rash  Allergic/Immunologic: Negative for food allergies  Neurological: Negative for dizziness and tremors  Hematological: Negative for adenopathy  Psychiatric/Behavioral: Positive for dysphoric mood  Negative for sleep disturbance  All other systems reviewed and are negative  Physical Exam:  Body mass index is 24 26 kg/m²  /60 (BP Location: Left arm, Patient Position: Sitting, Cuff Size: Standard)   Pulse 67   Ht 5' 1" (1 549 m)   SpO2 98%   BMI 24 26 kg/m²    Wt Readings from Last 3 Encounters:   09/10/21 58 2 kg (128 lb 6 4 oz)   03/19/21 64 8 kg (142 lb 12 8 oz)   10/08/20 74 8 kg (165 lb)       Physical Exam  Vitals reviewed  Constitutional:       General: She is not in acute distress  Appearance: She is well-developed  HENT:      Head: Normocephalic and atraumatic  Eyes:      Conjunctiva/sclera: Conjunctivae normal       Pupils: Pupils are equal, round, and reactive to light  Neck:      Thyroid: No thyromegaly  Cardiovascular:      Rate and Rhythm: Normal rate and regular rhythm  Heart sounds: Normal heart sounds     Pulmonary: Effort: Pulmonary effort is normal  No respiratory distress  Breath sounds: Normal breath sounds  No wheezing or rales  Abdominal:      General: Bowel sounds are normal  There is no distension  Palpations: Abdomen is soft  Tenderness: There is no abdominal tenderness  Musculoskeletal:         General: Normal range of motion  Cervical back: Normal range of motion and neck supple  Skin:     General: Skin is warm and dry  Neurological:      Mental Status: She is alert and oriented to person, place, and time           Labs:   Component      Latest Ref Rng & Units 9/8/2020 9/8/2020 9/8/2020           8:51 AM  8:51 AM  8:51 AM   WBC      4 31 - 10 16 Thousand/uL  6 30    Red Blood Cell Count      3 81 - 5 12 Million/uL  4 89    Hemoglobin      11 5 - 15 4 g/dL  15 1    HCT      34 8 - 46 1 %  44 9    MCV      82 - 98 fL  92    MCH      26 8 - 34 3 pg  30 9    MCHC      31 4 - 37 4 g/dL  33 6    RDW      11 6 - 15 1 %  12 1    MPV      8 9 - 12 7 fL  10 6    Platelet Count      156 - 390 Thousands/uL  296    nRBC      /100 WBCs  0    Neutrophils %      43 - 75 %  66    Immat GRANS %      0 - 2 %  0    Lymphocytes Relative      14 - 44 %  23    Monocytes Relative      4 - 12 %  7    Eosinophils      0 - 6 %  3    Basophils Relative      0 - 1 %  1    Absolute Neutrophils      1 85 - 7 62 Thousands/µL  4 19    Immature Grans Absolute      0 00 - 0 20 Thousand/uL  0 02    Lymphocytes Absolute      0 60 - 4 47 Thousands/µL  1 45    Absolute Monocytes      0 17 - 1 22 Thousand/µL  0 41    Absolute Eosinophils      0 00 - 0 61 Thousand/µL  0 16    Basophils Absolute      0 00 - 0 10 Thousands/µL  0 07    Sodium      135 - 147 mmol/L      Potassium      3 5 - 5 3 mmol/L      Chloride      96 - 108 mmol/L      CO2      21 - 32 mmol/L      Anion Gap      4 - 13 mmol/L      BUN      5 - 25 mg/dL      Creatinine      0 60 - 1 30 mg/dL      GLUCOSE FASTING      65 - 99 mg/dL      Calcium      8 3 - 10 1 mg/dL      AST      5 - 45 U/L      ALT      12 - 78 U/L      Alkaline Phosphatase      46 - 116 U/L      Total Protein      6 4 - 8 2 g/dL      Albumin      3 5 - 5 0 g/dL      TOTAL BILIRUBIN      0 20 - 1 00 mg/dL      eGFR      ml/min/1 73sq m      Iron Saturation      %   33   TIBC      250 - 450 ug/dL   439   Iron      50 - 170 ug/dL   146   Hemoglobin A1C      Normal 3 8-5 6%; PreDiabetic 5 7-6 4%;  Diabetic >=6 5%; Glycemic control for adults with diabetes <7 0% %      eAG, EST AVG Glucose      mg/dl      TESTOSTERONE FREE      0 0 - 4 2 pg/mL      Testosterone, Total, LC/MS      8 - 60 ng/dL      INSULIN, FASTING      3 0 - 25 0 mU/L 11 0     Ferritin      8 - 388 ng/mL      TSH 3RD GENERATON      0 358 - 3 740 uIU/mL      Free T4      0 76 - 1 46 ng/dL        Component      Latest Ref Rng & Units 9/8/2020 9/8/2020 9/8/2020           8:51 AM  8:51 AM  8:51 AM   WBC      4 31 - 10 16 Thousand/uL      Red Blood Cell Count      3 81 - 5 12 Million/uL      Hemoglobin      11 5 - 15 4 g/dL      HCT      34 8 - 46 1 %      MCV      82 - 98 fL      MCH      26 8 - 34 3 pg      MCHC      31 4 - 37 4 g/dL      RDW      11 6 - 15 1 %      MPV      8 9 - 12 7 fL      Platelet Count      154 - 390 Thousands/uL      nRBC      /100 WBCs      Neutrophils %      43 - 75 %      Immat GRANS %      0 - 2 %      Lymphocytes Relative      14 - 44 %      Monocytes Relative      4 - 12 %      Eosinophils      0 - 6 %      Basophils Relative      0 - 1 %      Absolute Neutrophils      1 85 - 7 62 Thousands/µL      Immature Grans Absolute      0 00 - 0 20 Thousand/uL      Lymphocytes Absolute      0 60 - 4 47 Thousands/µL      Absolute Monocytes      0 17 - 1 22 Thousand/µL      Absolute Eosinophils      0 00 - 0 61 Thousand/µL      Basophils Absolute      0 00 - 0 10 Thousands/µL      Sodium      135 - 147 mmol/L      Potassium      3 5 - 5 3 mmol/L      Chloride      96 - 108 mmol/L      CO2      21 - 32 mmol/L      Anion Gap 4 - 13 mmol/L      BUN      5 - 25 mg/dL      Creatinine      0 60 - 1 30 mg/dL      GLUCOSE FASTING      65 - 99 mg/dL      Calcium      8 3 - 10 1 mg/dL      AST      5 - 45 U/L      ALT      12 - 78 U/L      Alkaline Phosphatase      46 - 116 U/L      Total Protein      6 4 - 8 2 g/dL      Albumin      3 5 - 5 0 g/dL      TOTAL BILIRUBIN      0 20 - 1 00 mg/dL      eGFR      ml/min/1 73sq m      Iron Saturation      %      TIBC      250 - 450 ug/dL      Iron      50 - 170 ug/dL      Hemoglobin A1C      Normal 3 8-5 6%; PreDiabetic 5 7-6 4%;  Diabetic >=6 5%; Glycemic control for adults with diabetes <7 0% %  4 9    eAG, EST AVG Glucose      mg/dl  94    TESTOSTERONE FREE      0 0 - 4 2 pg/mL   4 2   Testosterone, Total, LC/MS      8 - 60 ng/dL   34   INSULIN, FASTING      3 0 - 25 0 mU/L      Ferritin      8 - 388 ng/mL 119     TSH 3RD GENERATON      0 358 - 3 740 uIU/mL      Free T4      0 76 - 1 46 ng/dL        Component      Latest Ref Rng & Units 3/15/2021 3/15/2021 3/15/2021           8:06 AM  8:06 AM  8:06 AM   WBC      4 31 - 10 16 Thousand/uL      Red Blood Cell Count      3 81 - 5 12 Million/uL      Hemoglobin      11 5 - 15 4 g/dL      HCT      34 8 - 46 1 %      MCV      82 - 98 fL      MCH      26 8 - 34 3 pg      MCHC      31 4 - 37 4 g/dL      RDW      11 6 - 15 1 %      MPV      8 9 - 12 7 fL      Platelet Count      537 - 390 Thousands/uL      nRBC      /100 WBCs      Neutrophils %      43 - 75 %      Immat GRANS %      0 - 2 %      Lymphocytes Relative      14 - 44 %      Monocytes Relative      4 - 12 %      Eosinophils      0 - 6 %      Basophils Relative      0 - 1 %      Absolute Neutrophils      1 85 - 7 62 Thousands/µL      Immature Grans Absolute      0 00 - 0 20 Thousand/uL      Lymphocytes Absolute      0 60 - 4 47 Thousands/µL      Absolute Monocytes      0 17 - 1 22 Thousand/µL      Absolute Eosinophils      0 00 - 0 61 Thousand/µL      Basophils Absolute      0 00 - 0 10 Thousands/µL Sodium      135 - 147 mmol/L 138     Potassium      3 5 - 5 3 mmol/L 4 4     Chloride      96 - 108 mmol/L 108     CO2      21 - 32 mmol/L 26     Anion Gap      4 - 13 mmol/L 4     BUN      5 - 25 mg/dL 18     Creatinine      0 60 - 1 30 mg/dL 1 10     GLUCOSE FASTING      65 - 99 mg/dL 78     Calcium      8 3 - 10 1 mg/dL 9 2     AST      5 - 45 U/L 13     ALT      12 - 78 U/L 18     Alkaline Phosphatase      46 - 116 U/L 55     Total Protein      6 4 - 8 2 g/dL 6 9     Albumin      3 5 - 5 0 g/dL 3 7     TOTAL BILIRUBIN      0 20 - 1 00 mg/dL 0 48     eGFR      ml/min/1 73sq m 67     Iron Saturation      %      TIBC      250 - 450 ug/dL      Iron      50 - 170 ug/dL      Hemoglobin A1C      Normal 3 8-5 6%; PreDiabetic 5 7-6 4%;  Diabetic >=6 5%; Glycemic control for adults with diabetes <7 0% %      eAG, EST AVG Glucose      mg/dl      TESTOSTERONE FREE      0 0 - 4 2 pg/mL      Testosterone, Total, LC/MS      8 - 60 ng/dL      INSULIN, FASTING      3 0 - 25 0 mU/L      Ferritin      8 - 388 ng/mL      TSH 3RD GENERATON      0 358 - 3 740 uIU/mL  2 330    Free T4      0 76 - 1 46 ng/dL   1 01     Component      Latest Ref Rng & Units 11/8/2022 11/8/2022           9:11 AM  9:11 AM   WBC      4 31 - 10 16 Thousand/uL     Red Blood Cell Count      3 81 - 5 12 Million/uL     Hemoglobin      11 5 - 15 4 g/dL     HCT      34 8 - 46 1 %     MCV      82 - 98 fL     MCH      26 8 - 34 3 pg     MCHC      31 4 - 37 4 g/dL     RDW      11 6 - 15 1 %     MPV      8 9 - 12 7 fL     Platelet Count      036 - 390 Thousands/uL     nRBC      /100 WBCs     Neutrophils %      43 - 75 %     Immat GRANS %      0 - 2 %     Lymphocytes Relative      14 - 44 %     Monocytes Relative      4 - 12 %     Eosinophils      0 - 6 %     Basophils Relative      0 - 1 %     Absolute Neutrophils      1 85 - 7 62 Thousands/µL     Immature Grans Absolute      0 00 - 0 20 Thousand/uL     Lymphocytes Absolute      0 60 - 4 47 Thousands/µL Absolute Monocytes      0 17 - 1 22 Thousand/µL     Absolute Eosinophils      0 00 - 0 61 Thousand/µL     Basophils Absolute      0 00 - 0 10 Thousands/µL     Sodium      135 - 147 mmol/L 138    Potassium      3 5 - 5 3 mmol/L 4 4    Chloride      96 - 108 mmol/L 108    CO2      21 - 32 mmol/L 26    Anion Gap      4 - 13 mmol/L 4    BUN      5 - 25 mg/dL 14    Creatinine      0 60 - 1 30 mg/dL 1 02    GLUCOSE FASTING      65 - 99 mg/dL 93    Calcium      8 3 - 10 1 mg/dL 9 2    AST      5 - 45 U/L     ALT      12 - 78 U/L     Alkaline Phosphatase      46 - 116 U/L     Total Protein      6 4 - 8 2 g/dL     Albumin      3 5 - 5 0 g/dL     TOTAL BILIRUBIN      0 20 - 1 00 mg/dL     eGFR      ml/min/1 73sq m 71    Iron Saturation      %     TIBC      250 - 450 ug/dL     Iron      50 - 170 ug/dL     Hemoglobin A1C      Normal 3 8-5 6%; PreDiabetic 5 7-6 4%; Diabetic >=6 5%; Glycemic control for adults with diabetes <7 0% %     eAG, EST AVG Glucose      mg/dl     TESTOSTERONE FREE      0 0 - 4 2 pg/mL  3 1   Testosterone, Total, LC/MS      8 - 60 ng/dL  22   INSULIN, FASTING      3 0 - 25 0 mU/L     Ferritin      8 - 388 ng/mL     TSH 3RD GENERATON      0 358 - 3 740 uIU/mL     Free T4      0 76 - 1 46 ng/dL         Impression & Plan:    Problem List Items Addressed This Visit        Endocrine    PCOS (polycystic ovarian syndrome) - Primary    Relevant Orders    Basic metabolic panel    Hashimoto's thyroiditis     Thyroid function and antibody testing was normal on previous labs  Will monitor with repeat lab testing for TSH/Free T4 in 1 year  Relevant Orders    TSH, 3rd generation    T4, free    Impaired fasting glucose     Fasting glucose has been normal              Musculoskeletal and Integument    Hirsutism     Continue spironolactone  Relevant Medications    spironolactone (ALDACTONE) 50 mg tablet       Other    Depression     She reports history of depressions and feels medication is necessary  She denies suicidal ideations  She does not have PCP or psychiatrist  Nuvia Benitez that she establish care and offered to make referral but she declines due to work schedule  Encouraged her to check with her employer/health insurance regarding any virtual options for mental health  Orders Placed This Encounter   Procedures   • Basic metabolic panel     This is a patient instruction: Patient fasting for 8 hours or longer recommended  Standing Status:   Future     Standing Expiration Date:   5/14/2024   • TSH, 3rd generation     This is a patient instruction: This test is non-fasting  Please drink two glasses of water morning of bloodwork  Standing Status:   Future     Standing Expiration Date:   5/14/2024   • T4, free     Standing Status:   Future     Standing Expiration Date:   5/14/2024       There are no Patient Instructions on file for this visit  Discussed with the patient and all questioned fully answered  She will call me if any problems arise  Follow-up appointment in 12 months       Counseled patient on diagnostic results, prognosis, risk and benefit of treatment options, instruction for management, importance of treatment compliance, Risk  factor reduction and impressions      Caridad Alston PA-C

## 2022-11-14 NOTE — ASSESSMENT & PLAN NOTE
Thyroid function and antibody testing was normal on previous labs  Will monitor with repeat lab testing for TSH/Free T4 in 1 year

## 2022-11-14 NOTE — ASSESSMENT & PLAN NOTE
She reports history of depressions and feels medication is necessary  She denies suicidal ideations  She does not have PCP or psychiatrist  Isaias [place] that she establish care and offered to make referral but she declines due to work schedule  Encouraged her to check with her employer/health insurance regarding any virtual options for mental health

## 2022-12-15 ENCOUNTER — OFFICE VISIT (OUTPATIENT)
Dept: URGENT CARE | Facility: CLINIC | Age: 32
End: 2022-12-15

## 2022-12-15 VITALS
RESPIRATION RATE: 18 BRPM | OXYGEN SATURATION: 98 % | DIASTOLIC BLOOD PRESSURE: 62 MMHG | SYSTOLIC BLOOD PRESSURE: 112 MMHG | HEART RATE: 76 BPM | TEMPERATURE: 99.5 F

## 2022-12-15 DIAGNOSIS — B02.9 HERPES ZOSTER WITHOUT COMPLICATION: Primary | ICD-10-CM

## 2022-12-15 RX ORDER — PREDNISONE 50 MG/1
50 TABLET ORAL DAILY
Qty: 5 TABLET | Refills: 0 | Status: SHIPPED | OUTPATIENT
Start: 2022-12-15 | End: 2022-12-20

## 2022-12-15 NOTE — PATIENT INSTRUCTIONS
Patient was educated on shingles  Patient ws educated on steroids  Patient was educated any chest pain or shortness  of breath go to ED    Shingles   WHAT YOU NEED TO KNOW:   Shingles is a painful rash  Shingles is caused by the same virus that causes chickenpox (varicella-zoster)  After you get chickenpox, the virus stays in your body for several years without causing any symptoms  Shingles occurs when the virus becomes active again  The active virus travels along a nerve to your skin and causes a rash  DISCHARGE INSTRUCTIONS:   Call your local emergency number (911 in the [address] [facility]) if:   You have trouble moving your arms, legs, or face  You become confused, or have difficulty speaking  You have a seizure  Return to the emergency department if:   You have weakness in an arm or leg  You have dizziness, a severe headache, or hearing or vision loss  You have painful, red, warm skin around the blisters, or the blisters drain pus  Your neck is stiff or you have trouble moving it  Call your doctor if:   You feel weak or have a headache  You have a cough, chills, or a fever  You have abdominal pain or nausea, or you are vomiting  Your rash becomes more itchy or painful  Your rash spreads to other parts of your body  Your pain worsens and does not go away even after you take medicine  You have questions or concerns about your condition or care  Medicines: You may need any of the following:  Antiviral medicine  helps decrease symptoms and healing time  They may also decrease your risk of developing nerve pain  You will need to start taking them within 3 days of the start of symptoms to prevent nerve pain  Prescription pain medicine  may be given  Ask your healthcare provider how to take this medicine safely  Some prescription pain medicines contain acetaminophen  Do not take other medicines that contain acetaminophen without talking to your healthcare provider   Too much acetaminophen may cause liver damage  Prescription pain medicine may cause constipation  Ask your healthcare provider how to prevent or treat constipation  Acetaminophen  decreases pain and fever  It is available without a doctor's order  Ask how much to take and how often to take it  Follow directions  Read the labels of all other medicines you are using to see if they also contain acetaminophen, or ask your doctor or pharmacist  Acetaminophen can cause liver damage if not taken correctly  Do not use more than 4 grams (4,000 milligrams) total of acetaminophen in one day  NSAIDs , such as ibuprofen, help decrease swelling, pain, and fever  This medicine is available with or without a doctor's order  NSAIDs can cause stomach bleeding or kidney problems in certain people  If you take blood thinner medicine, always ask if NSAIDs are safe for you  Always read the medicine label and follow directions  Do not give these medicines to children under 10months of age without direction from your child's healthcare provider  Topical anesthetics  are used to numb the skin and decrease pain  They can be a cream, gel, spray, or patch  Anticonvulsants  decrease nerve pain and may help you sleep at night  Antidepressants  may be used to decrease nerve pain  Take your medicine as directed  Contact your healthcare provider if you think your medicine is not helping or if you have side effects  Tell him of her if you are allergic to any medicine  Keep a list of the medicines, vitamins, and herbs you take  Include the amounts, and when and why you take them  Bring the list or the pill bottles to follow-up visits  Carry your medicine list with you in case of an emergency  Self-care:  Keep your rash clean and dry  Cover your rash with a bandage or clothing  Do not use bandages that stick to your skin  The sticky part may irritate your skin and make your rash last longer    Prevent the spread of germs:       Wash your hands often  Wash your hands several times each day  Wash after you use the bathroom, change a child's diaper, and before you prepare or eat food  Use soap and water every time  Rub your soapy hands together, lacing your fingers  Wash the front and back of your hands, and in between your fingers  Use the fingers of one hand to scrub under the fingernails of the other hand  Wash for at least 20 seconds  Rinse with warm, running water for several seconds  Then dry your hands with a clean towel or paper towel  Use hand  that contains alcohol if soap and water are not available  Do not touch your eyes, nose, or mouth without washing your hands first          Cover a sneeze or cough  Use a tissue that covers your mouth and nose  Throw the tissue away in a trash can right away  Use the bend of your arm if a tissue is not available  Wash your hands well with soap and water or use a hand   Stay away from others while you are sick  Avoid crowds as much as possible  Ask about vaccines you may need  Talk to your healthcare provider about your vaccine history  He or she will tell you which vaccines you need, and when to get them  Prevent shingles or another shingles outbreak:  A vaccine may be given to help prevent shingles  You can get the vaccine even if you already had shingles  The vaccine can help prevent a future outbreak  If you do get shingles again, the vaccine can keep it from becoming severe  The vaccine comes in 2 forms  Your healthcare provider will tell you which form is right for you  The decision is based on your age and any medical conditions you have  A 2-dose vaccine is usually given to adults 48 years or older  A 1-dose vaccine may be given to adults 61 years or older  Follow up with your doctor as directed:  Write down your questions so you remember to ask them during your visits    For more information:   [facility] [employer] [facility] Prevention  [address] [facility]  Phone: 9- 097 - 9774589  Phone: 2- 156 - 2580145  Web Address: [employer] com br    © Copyright [employer] 2022 Information is for End User's use only and may not be sold, redistributed or otherwise used for commercial purposes  All illustrations and images included in CareNotes® are the copyrighted property of [employer]  or [facility]   The above information is an  only  It is not intended as medical advice for individual conditions or treatments  Talk to your doctor, nurse or pharmacist before following any medical regimen to see if it is safe and effective for you

## 2022-12-15 NOTE — PROGRESS NOTES
[address] [facility]        NAME: Ann Marie Reddy is a 27 y o  female  : 1990    MRN: [id number]  DATE: December 15, 2022  TIME: 3:11 PM    Assessment and Plan   Herpes zoster without complication [W11 4]  1  Herpes zoster without complication  predniSONE 50 mg tablet        Declined inhaler for cough    Patient Instructions     Patient was educated on shingles  Patient ws educated on steroids  Patient was educated any chest pain or shortness  of breath go to ED    Chief Complaint     Chief Complaint   Patient presents with   • Rash     Began on right flank two days ago  Burning pain at the site  History of Present Illness       Patient is here today with her boyfriend for burning on right flank  Patient reports history of shingles  Denies any allergies to medications  Denies any asthma or diabetes  Review of Systems   Review of Systems   Constitutional: Negative  Respiratory: Positive for cough  Skin: Positive for rash  Rash on right flank   Psychiatric/Behavioral: Negative            Current Medications       Current Outpatient Medications:   •  Ascorbic Acid (VITAMIN C) 100 MG tablet, Take 100 mg by mouth daily, Disp: , Rfl:   •  Biotin 1 MG CAPS, Take 1 capsule by mouth daily , Disp: , Rfl:   •  cholecalciferol (VITAMIN D3) 400 units tablet, Take 400 Units by mouth daily, Disp: , Rfl:   •  multivitamin (THERAGRAN) TABS, Take 1 tablet by mouth daily, Disp: , Rfl:   •  Omega-3 Fatty Acids (FISH OIL) 1,000 mg, Take 1,000 mg by mouth daily, Disp: , Rfl:   •  predniSONE 50 mg tablet, Take 1 tablet (50 mg total) by mouth daily for 5 days, Disp: 5 tablet, Rfl: 0  •  sertraline (ZOLOFT) 50 mg tablet, , Disp: , Rfl:   •  spironolactone (ALDACTONE) 50 mg tablet, TAKE 2 TABS IN THE MORNING AND 2 TAB IN THE EVENING , Disp: 360 tablet, Rfl: 3    Current Allergies     Allergies as of 12/15/2022   • (No Known Allergies)            The following portions of the patient's history were reviewed and updated as appropriate: allergies, current medications, past family history, past medical history, past social history, past surgical history and problem list      Past Medical History:   Diagnosis Date   • Alopecia    • Cervical cancer ([facility] [address] )    • Goiter    • Hashimoto's disease    • [facility]    • Obesity with serious comorbidity 6/4/2020   • PCOS (polycystic ovarian syndrome)        Past Surgical History:   Procedure Laterality Date   • PARTIAL HYSTERECTOMY     • SKIN SURGERY      melanoma resection ear   • WISDOM TOOTH EXTRACTION         Family History   Family history unknown: Yes         Medications have been verified  Objective   /62   Pulse 76   Temp 99 5 °F (37 5 °C)   Resp 18   SpO2 98%   No LMP recorded  Physical Exam     Physical Exam  Vitals and nursing note reviewed  Constitutional:       Appearance: Normal appearance  HENT:      Head: Normocephalic  Right Ear: Tympanic membrane, ear canal and external ear normal       Left Ear: Tympanic membrane, ear canal and external ear normal       Mouth/Throat:      Mouth: Mucous membranes are moist       Pharynx: No oropharyngeal exudate or posterior oropharyngeal erythema  Comments: PND  Eyes:      Extraocular Movements: Extraocular movements intact  Pupils: Pupils are equal, round, and reactive to light  Cardiovascular:      Rate and Rhythm: Normal rate and regular rhythm  Heart sounds: Normal heart sounds  Pulmonary:      Breath sounds: Normal breath sounds  No wheezing  Skin:     Comments: Shingles noted on right flankl   Neurological:      General: No focal deficit present  Mental Status: She is alert and oriented to person, place, and time     Psychiatric:         Mood and Affect: Mood normal          Behavior: Behavior normal

## 2022-12-17 ENCOUNTER — OFFICE VISIT (OUTPATIENT)
Dept: URGENT CARE | Facility: CLINIC | Age: 32
End: 2022-12-17

## 2022-12-17 VITALS
RESPIRATION RATE: 16 BRPM | DIASTOLIC BLOOD PRESSURE: 65 MMHG | HEART RATE: 56 BPM | OXYGEN SATURATION: 99 % | SYSTOLIC BLOOD PRESSURE: 118 MMHG | TEMPERATURE: 98 F

## 2022-12-17 DIAGNOSIS — B02.7 DISSEMINATED HERPES ZOSTER: Primary | ICD-10-CM

## 2022-12-17 RX ORDER — LIDOCAINE 50 MG/G
OINTMENT TOPICAL AS NEEDED
Qty: 35.44 G | Refills: 0 | Status: SHIPPED | OUTPATIENT
Start: 2022-12-17

## 2022-12-17 RX ORDER — FLUTICASONE PROPIONATE 50 MCG
SPRAY, SUSPENSION (ML) NASAL
COMMUNITY
Start: 2022-11-29

## 2022-12-17 RX ORDER — VALACYCLOVIR HYDROCHLORIDE 1 G/1
1000 TABLET, FILM COATED ORAL 3 TIMES DAILY
Qty: 21 TABLET | Refills: 0 | Status: SHIPPED | OUTPATIENT
Start: 2022-12-17 | End: 2022-12-24

## 2022-12-17 RX ORDER — AMOXICILLIN 875 MG/1
TABLET, COATED ORAL
COMMUNITY
Start: 2022-10-21

## 2022-12-17 NOTE — PROGRESS NOTES
[facility] [facility]        NAME: Veena Ortiz is a 27 y o  female  : 1990    MRN: [id number]  DATE: 2022  TIME: 1:17 PM    Assessment and Plan   Disseminated herpes zoster [B02 7]  1  Disseminated herpes zoster  valACYclovir (VALTREX) 1,000 mg tablet    lidocaine (XYLOCAINE) 5 % ointment        Discussed pros and cons of antiviral therapy beyond 72 hour sathish of rash eruption  Patient decided to continue with antiviral medication due to severity  Patient Instructions   Medication as prescribed  Wash rash gently with soap and water  Wash hands often  This is contagious until the rash has completely crusted over  Follow up with PCP in 3-5 days  Proceed to  ER if symptoms worsen  Chief Complaint     Chief Complaint   Patient presents with   • Herpes Zoster     Pt reports she was [facility] Th[facility] at [facility] for [facility] but it's still spreading  History of Present Illness       Patient is a 68-year-old female with significant past medical history of PCOS, cervical cancer, hypothyroidism, and shingles presents the office complaining of worsening shingles  She was seen in the urgent care 2 days ago and diagnosed with shingles  At that time, she only had a small area of rash which has now grown in size and spread to the front  She was prescribed prednisone which she has been taking as prescribed  States in the past when she was diagnosed with shingles, she was prescribed antiviral medication which was not given at last visit  Pain is rated 6 to 7/10 described as burning  She has been using Tylenol with mild relief  Review of Systems   Review of Systems   Constitutional: Negative for chills and fever  HENT: Negative for trouble swallowing  Respiratory: Negative for cough and chest tightness  Skin: Positive for rash           Current Medications       Current Outpatient Medications:   •  lidocaine (XYLOCAINE) 5 % ointment, Apply topically as needed for mild pain, Disp: 35 44 g, Rfl: 0  •  valACYclovir (VALTREX) 1,000 mg tablet, Take 1 tablet (1,000 mg total) by mouth 3 (three) times a day for 7 days, Disp: 21 tablet, Rfl: 0  •  amoxicillin (AMOXIL) 875 mg tablet, TAKE 1 TABLET BY MOUTH TWICE A DAY FOR 7 DAYS (Patient not taking: Reported on 12/17/2022), Disp: , Rfl:   •  Ascorbic Acid (VITAMIN C) 100 MG tablet, Take 100 mg by mouth daily, Disp: , Rfl:   •  Biotin 1 MG CAPS, Take 1 capsule by mouth daily , Disp: , Rfl:   •  cholecalciferol (VITAMIN D3) 400 units tablet, Take 400 Units by mouth daily, Disp: , Rfl:   •  fluticasone (FLONASE) 50 mcg/act nasal spray, 2 SPRAY INTRANASALLY TWICE A DAY ADMINISTER INTO EACH NOSTRIL, Disp: , Rfl:   •  multivitamin (THERAGRAN) TABS, Take 1 tablet by mouth daily, Disp: , Rfl:   •  Omega-3 Fatty Acids (FISH OIL) 1,000 mg, Take 1,000 mg by mouth daily, Disp: , Rfl:   •  predniSONE 50 mg tablet, Take 1 tablet (50 mg total) by mouth daily for 5 days, Disp: 5 tablet, Rfl: 0  •  sertraline (ZOLOFT) 50 mg tablet, , Disp: , Rfl:   •  spironolactone (ALDACTONE) 50 mg tablet, TAKE 2 TABS IN THE MORNING AND 2 TAB IN THE EVENING , Disp: 360 tablet, Rfl: 3    Current Allergies     Allergies as of 12/17/2022   • (No Known Allergies)            The following portions of the patient's history were reviewed and updated as appropriate: allergies, current medications, past family history, past medical history, past social history, past surgical history and problem list      Past Medical History:   Diagnosis Date   • Alopecia    • Cervical cancer ([address] )    • Goiter    • Hashimoto's disease    • Melanoma ([address] )    • Obesity with serious comorbidity 6/4/2020   • PCOS (polycystic ovarian syndrome)        Past Surgical History:   Procedure Laterality Date   • PARTIAL HYSTERECTOMY     • SKIN SURGERY      melanoma resection ear   • WISDOM TOOTH EXTRACTION         Family History   Family history unknown: Yes         Medications have been verified          Objective /65   Pulse 56   Temp 98 °F (36 7 °C)   Resp 16   SpO2 99%   No LMP recorded  Physical Exam     Physical Exam  Vitals and nursing note reviewed  Constitutional:       Appearance: She is well-developed  HENT:      Head: Normocephalic and atraumatic  Right Ear: External ear normal       Left Ear: External ear normal       Nose: Nose normal    Eyes:      General: Lids are normal       Conjunctiva/sclera: Conjunctivae normal    Skin:     General: Skin is warm and dry  Capillary Refill: Capillary refill takes less than 2 seconds  Findings: Rash (Shingle like rash in the right T10 distribution  See image ) present  Neurological:      Mental Status: She is alert                 Right-sided back

## 2023-11-08 ENCOUNTER — APPOINTMENT (OUTPATIENT)
Dept: LAB | Facility: CLINIC | Age: 33
End: 2023-11-08
Payer: COMMERCIAL

## 2023-11-08 DIAGNOSIS — E28.2 PCOS (POLYCYSTIC OVARIAN SYNDROME): ICD-10-CM

## 2023-11-08 DIAGNOSIS — E06.3 HASHIMOTO'S THYROIDITIS: ICD-10-CM

## 2023-11-08 LAB
ANION GAP SERPL CALCULATED.3IONS-SCNC: 6 MMOL/L
BUN SERPL-MCNC: 16 MG/DL (ref 5–25)
CALCIUM SERPL-MCNC: 8.9 MG/DL (ref 8.4–10.2)
CHLORIDE SERPL-SCNC: 106 MMOL/L (ref 96–108)
CO2 SERPL-SCNC: 26 MMOL/L (ref 21–32)
CREAT SERPL-MCNC: 0.94 MG/DL (ref 0.6–1.3)
GFR SERPL CREATININE-BSD FRML MDRD: 78 ML/MIN/1.73SQ M
GLUCOSE P FAST SERPL-MCNC: 77 MG/DL (ref 65–99)
POTASSIUM SERPL-SCNC: 4.2 MMOL/L (ref 3.5–5.3)
SODIUM SERPL-SCNC: 138 MMOL/L (ref 135–147)
T4 FREE SERPL-MCNC: 1.12 NG/DL (ref 0.61–1.12)
TSH SERPL DL<=0.05 MIU/L-ACNC: 2.09 UIU/ML (ref 0.45–4.5)

## 2023-11-08 PROCEDURE — 80048 BASIC METABOLIC PNL TOTAL CA: CPT

## 2023-11-08 PROCEDURE — 84439 ASSAY OF FREE THYROXINE: CPT

## 2023-11-08 PROCEDURE — 36415 COLL VENOUS BLD VENIPUNCTURE: CPT

## 2023-11-08 PROCEDURE — 84443 ASSAY THYROID STIM HORMONE: CPT

## 2023-11-14 ENCOUNTER — OFFICE VISIT (OUTPATIENT)
Dept: ENDOCRINOLOGY | Facility: CLINIC | Age: 33
End: 2023-11-14
Payer: COMMERCIAL

## 2023-11-14 VITALS
SYSTOLIC BLOOD PRESSURE: 90 MMHG | DIASTOLIC BLOOD PRESSURE: 60 MMHG | BODY MASS INDEX: 24.26 KG/M2 | HEIGHT: 61 IN | HEART RATE: 79 BPM

## 2023-11-14 DIAGNOSIS — E06.3 HASHIMOTO'S THYROIDITIS: Primary | ICD-10-CM

## 2023-11-14 DIAGNOSIS — R73.01 IMPAIRED FASTING GLUCOSE: ICD-10-CM

## 2023-11-14 DIAGNOSIS — L68.0 HIRSUTISM: ICD-10-CM

## 2023-11-14 DIAGNOSIS — E28.2 PCOS (POLYCYSTIC OVARIAN SYNDROME): ICD-10-CM

## 2023-11-14 PROCEDURE — 99214 OFFICE O/P EST MOD 30 MIN: CPT | Performed by: PHYSICIAN ASSISTANT

## 2023-11-14 RX ORDER — LORAZEPAM 0.5 MG/1
TABLET ORAL
COMMUNITY
Start: 2023-10-30

## 2023-11-14 RX ORDER — SPIRONOLACTONE 50 MG/1
TABLET, FILM COATED ORAL
Qty: 360 TABLET | Refills: 3 | Status: SHIPPED | OUTPATIENT
Start: 2023-11-14

## 2023-11-14 NOTE — PROGRESS NOTES
Established Patient Progress Note       Chief Complaint   Patient presents with   • Polycystic Ovary Syndrome        Impression & Plan:    Problem List Items Addressed This Visit        Endocrine    PCOS (polycystic ovarian syndrome)     Continue treatment with spironolactone. Hashimoto's thyroiditis - Primary    Relevant Orders    TSH, 3rd generation    T4, free    Impaired fasting glucose     This has improved and recent fasting glucose normal.             Musculoskeletal and Integument    Hirsutism     Continue treatment with spironolactone          Relevant Medications    spironolactone (ALDACTONE) 50 mg tablet    Other Relevant Orders    Basic metabolic panel         Orders Placed This Encounter   Procedures   • TSH, 3rd generation     This is a patient instruction: This test is non-fasting. Please drink two glasses of water morning of bloodwork. Standing Status:   Future     Standing Expiration Date:   5/14/2025   • T4, free     Standing Status:   Future     Standing Expiration Date:   5/14/2025   • Basic metabolic panel     This is a patient instruction: Patient fasting for 8 hours or longer recommended. Standing Status:   Future     Standing Expiration Date:   5/14/2025         History of Present Illness:     Lore Tejada is a 26 y.o. female with a history of PCOS. She also has history of hysterectomy for cervical cancer, ovaries remain in place. Remains off OCP/Estrogen patch for the past few years as she did not find it to be helpful. She is taking spironolactone 100mg twice per day. She denies any symptoms of lightheaded or dizziness. Acne has improved. Hair loss has improved and hair growth on the face has improved. She does report depression and has seen PCP since last visit and started on medication and doing much better. Since last visit, she had sinus surgery and was not able to exercise due to recovery and has had weight gain.   She has recently been able to resume exercise. Patient Active Problem List   Diagnosis   • PCOS (polycystic ovarian syndrome)   • Hair loss   • Hashimoto's thyroiditis   • Hirsutism   • Impaired fasting glucose   • Depression      Past Medical History:   Diagnosis Date   • Alopecia    • Cervical cancer ([facility])    • Goiter    • Hashimoto's disease    • Melanoma ([address])    • Obesity with serious comorbidity 6/4/2020   • PCOS (polycystic ovarian syndrome)       Past Surgical History:   Procedure Laterality Date   • PARTIAL HYSTERECTOMY     • SKIN SURGERY      melanoma resection ear   • WISDOM TOOTH EXTRACTION        Family History   Family history unknown: Yes     Social History     Tobacco Use   • Smoking status: Never   • Smokeless tobacco: Never   Substance Use Topics   • Alcohol use: Not Currently     No Known Allergies    Current Outpatient Medications:   •  Ascorbic Acid (VITAMIN C) 100 MG tablet, Take 100 mg by mouth daily, Disp: , Rfl:   •  Biotin 1 MG CAPS, Take 1 capsule by mouth daily , Disp: , Rfl:   •  cholecalciferol (VITAMIN D3) 400 units tablet, Take 400 Units by mouth daily, Disp: , Rfl:   •  LORazepam (ATIVAN) 0.5 mg tablet, AS NEEDED FOR ANXIETY 0.5 MG ORALLY 1/2 -1 TAB ONCE A DAY AS NEEDED., Disp: , Rfl:   •  multivitamin (THERAGRAN) TABS, Take 1 tablet by mouth daily, Disp: , Rfl:   •  Omega-3 Fatty Acids (FISH OIL) 1,000 mg, Take 1,000 mg by mouth daily, Disp: , Rfl:   •  sertraline (ZOLOFT) 50 mg tablet, , Disp: , Rfl:   •  spironolactone (ALDACTONE) 50 mg tablet, TAKE 2 TABS IN THE MORNING AND 2 TAB IN THE EVENING., Disp: 360 tablet, Rfl: 3  •  valACYclovir (VALTREX) 1,000 mg tablet, Take 1 tablet (1,000 mg total) by mouth 3 (three) times a day for 7 days, Disp: 21 tablet, Rfl: 0    Review of Systems   Constitutional:  Negative for activity change, appetite change, chills, diaphoresis, fatigue, fever and unexpected weight change. HENT:  Negative for trouble swallowing and voice change.     Eyes:  Negative for visual disturbance. Respiratory:  Negative for shortness of breath. Cardiovascular:  Negative for chest pain and palpitations. Gastrointestinal:  Negative for abdominal pain, constipation and diarrhea. Endocrine: Negative for cold intolerance, heat intolerance, polydipsia, polyphagia and polyuria. Genitourinary:  Negative for frequency and menstrual problem. Musculoskeletal:  Negative for arthralgias and myalgias. Skin:  Negative for rash. Allergic/Immunologic: Negative for food allergies. Neurological:  Negative for dizziness and tremors. Hematological:  Negative for adenopathy. Psychiatric/Behavioral:  Negative for sleep disturbance. All other systems reviewed and are negative. Physical Exam:  Body mass index is 24.26 kg/m². BP 90/60   Pulse 79   Ht 5' 1" (1.549 m)   BMI 24.26 kg/m²    Wt Readings from Last 3 Encounters:   09/10/21 58.2 kg (128 lb 6.4 oz)   03/19/21 64.8 kg (142 lb 12.8 oz)   10/08/20 74.8 kg (165 lb)       Physical Exam  Vitals reviewed. Constitutional:       General: She is not in acute distress. Appearance: Normal appearance. She is well-developed. She is not toxic-appearing. HENT:      Head: Normocephalic and atraumatic. Eyes:      Conjunctiva/sclera: Conjunctivae normal.      Pupils: Pupils are equal, round, and reactive to light. Neck:      Thyroid: No thyromegaly. Cardiovascular:      Rate and Rhythm: Normal rate and regular rhythm. Heart sounds: Normal heart sounds. Pulmonary:      Effort: Pulmonary effort is normal. No respiratory distress. Breath sounds: Normal breath sounds. No wheezing or rales. Abdominal:      General: Bowel sounds are normal. There is no distension. Palpations: Abdomen is soft. Tenderness: There is no abdominal tenderness. Musculoskeletal:         General: Normal range of motion. Cervical back: Normal range of motion and neck supple. Skin:     General: Skin is warm and dry.    Neurological: Mental Status: She is alert and oriented to person, place, and time. Psychiatric:         Mood and Affect: Mood normal.         Behavior: Behavior normal.         Labs:   Component      Latest Ref Rng 11/8/2023   Sodium      135 - 147 mmol/L 138    Potassium      3.5 - 5.3 mmol/L 4.2    Chloride      96 - 108 mmol/L 106    CO2      21 - 32 mmol/L 26    Anion Gap      mmol/L 6    BUN      5 - 25 mg/dL 16    Creatinine      0.60 - 1.30 mg/dL 0.94    GLUCOSE FASTING      65 - 99 mg/dL 77    Calcium      8.4 - 10.2 mg/dL 8.9    eGFR      ml/min/1.73sq m 78    TSH 3RD GENERATON      0.450 - 4.500 uIU/mL 2.091    Free T4      0.61 - 1.12 ng/dL 1.12            There are no Patient Instructions on file for this visit. Discussed with the patient and all questioned fully answered. She will call me if any problems arise. Follow-up appointment in 12 months.      Counseled patient on diagnostic results, prognosis, risk and benefit of treatment options, instruction for management, importance of treatment compliance, Risk  factor reduction and impressions    Abdirizak Howell PA-C

## 2023-12-17 NOTE — TELEPHONE ENCOUNTER
Left message for pt to call back to discuss Ba's note
Person back from [place], she has been out the past few days, she   will review when able 
Please let patient know I heard back from dietician, sorry for the delay  Overall diet looks pretty good, but there may be some excess calories coming from fat in foods such as maya, sausage, mendiola, dressings gauc  -- but hard to say for sure without more details  They suggest a 1200 calorie meal plan- Bump up the exercise, and decrease added fat  If insurance wont cover meeting with dietician I'm going to mail her flyer for [facility] weight management program- there are some options to meet with a dietician for low out of pocket cost and some other weight loss options 
Spoke with pt, she is going to ck with ins re: coverage for dietician but mean while she would like for you to send her the flyer for [facility] program
Sure I put the flyer in the mail last week on Friday so it should be arriving soon
You are welcome! I will take a look at it tomorrow and get back to you 
warm

## 2024-01-31 NOTE — TELEPHONE ENCOUNTER
Osker Dakins has an appt with you on 03/19/21  She is asking if you could order any labs that might like done prior to her appt  She uses [facility] labs if you do  Please advise  Thank you! Your CT scan today was reassuring with no bleeding in the brain, no broken bones.  Your symptoms should start to resolve and tingling you are experiencing over the area that was injured is likely resulting from healing from the injury felt    You should return to the emergency department for re-evaluation if you develop nausea or vomiting, weakness or numbness in your arms or legs, visual changes, severe headache, or for any other concerns

## 2024-11-21 ENCOUNTER — APPOINTMENT (OUTPATIENT)
Dept: LAB | Facility: CLINIC | Age: 34
End: 2024-11-21
Payer: COMMERCIAL

## 2024-11-21 DIAGNOSIS — L68.0 HIRSUTISM: ICD-10-CM

## 2024-11-21 DIAGNOSIS — E06.3 HASHIMOTO'S THYROIDITIS: ICD-10-CM

## 2024-11-21 LAB
ANION GAP SERPL CALCULATED.3IONS-SCNC: 8 MMOL/L (ref 4–13)
BUN SERPL-MCNC: 11 MG/DL (ref 5–25)
CALCIUM SERPL-MCNC: 9.2 MG/DL (ref 8.4–10.2)
CHLORIDE SERPL-SCNC: 102 MMOL/L (ref 96–108)
CO2 SERPL-SCNC: 27 MMOL/L (ref 21–32)
CREAT SERPL-MCNC: 1.09 MG/DL (ref 0.6–1.3)
GFR SERPL CREATININE-BSD FRML MDRD: 65 ML/MIN/1.73SQ M
GLUCOSE SERPL-MCNC: 75 MG/DL (ref 65–140)
POTASSIUM SERPL-SCNC: 5.2 MMOL/L (ref 3.5–5.3)
SODIUM SERPL-SCNC: 137 MMOL/L (ref 135–147)
T4 FREE SERPL-MCNC: 0.7 NG/DL (ref 0.61–1.12)
TSH SERPL DL<=0.05 MIU/L-ACNC: 1.88 UIU/ML (ref 0.45–4.5)

## 2024-11-21 PROCEDURE — 84443 ASSAY THYROID STIM HORMONE: CPT

## 2024-11-21 PROCEDURE — 84439 ASSAY OF FREE THYROXINE: CPT

## 2024-11-21 PROCEDURE — 80048 BASIC METABOLIC PNL TOTAL CA: CPT

## 2024-11-21 PROCEDURE — 36415 COLL VENOUS BLD VENIPUNCTURE: CPT

## 2024-11-22 ENCOUNTER — OFFICE VISIT (OUTPATIENT)
Dept: ENDOCRINOLOGY | Facility: CLINIC | Age: 34
End: 2024-11-22
Payer: COMMERCIAL

## 2024-11-22 VITALS
DIASTOLIC BLOOD PRESSURE: 60 MMHG | HEIGHT: 61 IN | HEART RATE: 63 BPM | OXYGEN SATURATION: 98 % | BODY MASS INDEX: 24.26 KG/M2 | SYSTOLIC BLOOD PRESSURE: 110 MMHG

## 2024-11-22 DIAGNOSIS — E06.3 HASHIMOTO'S THYROIDITIS: ICD-10-CM

## 2024-11-22 DIAGNOSIS — R73.01 IMPAIRED FASTING GLUCOSE: ICD-10-CM

## 2024-11-22 DIAGNOSIS — L68.0 HIRSUTISM: ICD-10-CM

## 2024-11-22 DIAGNOSIS — E28.2 PCOS (POLYCYSTIC OVARIAN SYNDROME): Primary | ICD-10-CM

## 2024-11-22 PROCEDURE — 99214 OFFICE O/P EST MOD 30 MIN: CPT | Performed by: INTERNAL MEDICINE

## 2024-11-22 RX ORDER — SERTRALINE HYDROCHLORIDE 100 MG/1
TABLET, FILM COATED ORAL
COMMUNITY
Start: 2024-10-09

## 2024-11-22 NOTE — ASSESSMENT & PLAN NOTE
This is relatively stable on spironolactone.  We did discuss potassium being close to the high range of normal.  I gave her information about foods that contain less potassium.  Since her PCOS is well controlled, we will plan to see her back as needed.  I will plan to have her return to her primary care physician for healthcare needs.  At least once a year, she should have a CMP, A1c, lipid panel, TSH with reflex to free T4.    Today, I have ordered A1c and lipid panel.    Orders:    Hemoglobin A1C; Future    Lipid panel; Future

## 2024-11-22 NOTE — PROGRESS NOTES
Name: Tiny Fishman      : 1990      MRN: [id number]  Encounter Provider: Nadine Pires MD  Encounter Date: 2024   Encounter department: [facility] [place]  :  Assessment & Plan  PCOS (polycystic ovarian syndrome)  This is relatively stable on spironolactone.  We did discuss potassium being close to the high range of normal.  I gave her information about foods that contain less potassium.  Since her PCOS is well controlled, we will plan to see her back as needed.  I will plan to have her return to her primary care physician for healthcare needs.  At least once a year, she should have a CMP, A1c, lipid panel, TSH with reflex to free T4.    Today, I have ordered A1c and lipid panel.    Orders:    Hemoglobin A1C; Future    Lipid panel; Future    Hashimoto's thyroiditis  Most recent thyroid testing is normal.  She is biochemically and clinically euthyroid at this time.         Impaired fasting glucose  Check hemoglobin A1c.         Hirsutism  This is stable.             History of Present Illness     HPI  Tiny Fishman is a 34 y.o. female who presents for follow-up of PCOS.  She states that she is feeling well and has no complaints.  She is currently on spironolactone.  Her potassium level is on the higher side of normal.  History obtained from: patient    Review of Systems   Constitutional:  Negative for chills and fever.   Respiratory:  Negative for shortness of breath.    Cardiovascular:  Negative for chest pain.   Gastrointestinal:  Negative for constipation, diarrhea, nausea and vomiting.   All other systems reviewed and are negative.    Current Outpatient Medications on File Prior to Visit   Medication Sig Dispense Refill    Ascorbic Acid (VITAMIN C) 100 MG tablet Take 100 mg by mouth daily      Biotin 1 MG CAPS Take 1 capsule by mouth daily       cholecalciferol (VITAMIN D3) 400 units tablet Take 400 Units by mouth daily      LORazepam (ATIVAN)  "0.5 mg tablet AS NEEDED FOR ANXIETY 0.5 MG ORALLY 1/2 -1 TAB ONCE A DAY AS NEEDED. (Patient taking differently: as needed)      multivitamin (THERAGRAN) TABS Take 1 tablet by mouth daily      Omega-3 Fatty Acids (FISH OIL) 1,000 mg Take 1,000 mg by mouth daily      sertraline (ZOLOFT) 100 mg tablet 100 MG ORALLY DAILY      spironolactone (ALDACTONE) 50 mg tablet TAKE 2 TABS IN THE MORNING AND 2 TAB IN THE EVENING. 360 tablet 3    sertraline (ZOLOFT) 50 mg tablet  (Patient not taking: Reported on 11/22/2024)      [DISCONTINUED] valACYclovir (VALTREX) 1,000 mg tablet Take 1 tablet (1,000 mg total) by mouth 3 (three) times a day for 7 days 21 tablet 0     No current facility-administered medications on file prior to visit.         Objective   /60   Pulse 63   Ht 5' 1\" (1.549 m)   SpO2 98%   BMI 24.26 kg/m²      Physical Exam  Vitals and nursing note reviewed.   Constitutional:       Appearance: Normal appearance.   HENT:      Head: Normocephalic and atraumatic.   Eyes:      General: No scleral icterus.        Right eye: No discharge.         Left eye: No discharge.   Pulmonary:      Effort: Pulmonary effort is normal.   Musculoskeletal:         General: Normal range of motion.      Cervical back: Normal range of motion.   Skin:     Coloration: Skin is not jaundiced.   Neurological:      General: No focal deficit present.      Mental Status: She is alert and oriented to person, place, and time.      Cranial Nerves: No cranial nerve deficit.   Psychiatric:         Mood and Affect: Mood normal.         Behavior: Behavior normal.           "

## 2024-11-22 NOTE — ASSESSMENT & PLAN NOTE
Most recent thyroid testing is normal.  She is biochemically and clinically euthyroid at this time.

## 2024-12-09 DIAGNOSIS — L68.0 HIRSUTISM: ICD-10-CM

## 2024-12-10 RX ORDER — SPIRONOLACTONE 50 MG/1
TABLET, FILM COATED ORAL
Qty: 360 TABLET | Refills: 3 | Status: SHIPPED | OUTPATIENT
Start: 2024-12-10

## 2024-12-20 ENCOUNTER — APPOINTMENT (EMERGENCY)
Dept: RADIOLOGY | Facility: HOSPITAL | Age: 34
End: 2024-12-20
Payer: COMMERCIAL

## 2024-12-20 ENCOUNTER — APPOINTMENT (EMERGENCY)
Dept: CT IMAGING | Facility: HOSPITAL | Age: 34
End: 2024-12-20
Payer: COMMERCIAL

## 2024-12-20 ENCOUNTER — HOSPITAL ENCOUNTER (EMERGENCY)
Facility: HOSPITAL | Age: 34
Discharge: HOME | End: 2024-12-20
Attending: EMERGENCY MEDICINE
Payer: COMMERCIAL

## 2024-12-20 VITALS
TEMPERATURE: 97.5 F | RESPIRATION RATE: 18 BRPM | BODY MASS INDEX: 24.26 KG/M2 | HEART RATE: 93 BPM | OXYGEN SATURATION: 98 % | SYSTOLIC BLOOD PRESSURE: 113 MMHG | DIASTOLIC BLOOD PRESSURE: 77 MMHG | HEIGHT: 61 IN
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
## 2024-12-20 DIAGNOSIS — T07.XXXA MULTIPLE ABRASIONS: ICD-10-CM

## 2024-12-20 DIAGNOSIS — T07.XXXA MULTIPLE CONTUSIONS: ICD-10-CM

## 2024-12-20 DIAGNOSIS — S09.90XA INJURY OF HEAD, INITIAL ENCOUNTER: ICD-10-CM

## 2024-12-20 DIAGNOSIS — S90.02XA CONTUSION OF LEFT ANKLE, INITIAL ENCOUNTER: ICD-10-CM

## 2024-12-20 LAB
ABO GROUP BLD: NORMAL
ALBUMIN SERPL BCG-MCNC: 4.1 G/DL (ref 3.5–5)
ALP SERPL-CCNC: 36 U/L (ref 34–104)
ALT SERPL W P-5'-P-CCNC: 14 U/L (ref 7–52)
ANION GAP SERPL CALCULATED.3IONS-SCNC: 5 MMOL/L (ref 4–13)
AST SERPL W P-5'-P-CCNC: 16 U/L (ref 13–39)
BASOPHILS # BLD AUTO: 0.05 THOUSANDS/ΜL (ref 0–0.1)
BASOPHILS NFR BLD AUTO: 0 % (ref 0–1)
BILIRUB SERPL-MCNC: 0.27 MG/DL (ref 0.2–1)
BLD GP AB SCN SERPL QL: NEGATIVE
BUN SERPL-MCNC: 18 MG/DL (ref 5–25)
CALCIUM SERPL-MCNC: 8.3 MG/DL (ref 8.4–10.2)
CHLORIDE SERPL-SCNC: 105 MMOL/L (ref 96–108)
CO2 SERPL-SCNC: 29 MMOL/L (ref 21–32)
CREAT SERPL-MCNC: 1.03 MG/DL (ref 0.6–1.3)
EOSINOPHIL # BLD AUTO: 0.09 THOUSAND/ΜL (ref 0–0.61)
EOSINOPHIL NFR BLD AUTO: 1 % (ref 0–6)
ERYTHROCYTE [DISTWIDTH] IN BLOOD BY AUTOMATED COUNT: 11.5 % (ref 11.6–15.1)
GFR SERPL CREATININE-BSD FRML MDRD: 70 ML/MIN/1.73SQ M
GLUCOSE SERPL-MCNC: 101 MG/DL (ref 65–140)
HCT VFR BLD AUTO: 42.1 % (ref 34.8–46.1)
HGB BLD-MCNC: 14.4 G/DL (ref 11.5–15.4)
IMM GRANULOCYTES # BLD AUTO: 0.08 THOUSAND/UL (ref 0–0.2)
IMM GRANULOCYTES NFR BLD AUTO: 1 % (ref 0–2)
LYMPHOCYTES # BLD AUTO: 1.62 THOUSANDS/ΜL (ref 0.6–4.47)
LYMPHOCYTES NFR BLD AUTO: 13 % (ref 14–44)
MCH RBC QN AUTO: 30.5 PG (ref 26.8–34.3)
MCHC RBC AUTO-ENTMCNC: 34.2 G/DL (ref 31.4–37.4)
MCV RBC AUTO: 89 FL (ref 82–98)
MONOCYTES # BLD AUTO: 0.62 THOUSAND/ΜL (ref 0.17–1.22)
MONOCYTES NFR BLD AUTO: 5 % (ref 4–12)
NEUTROPHILS # BLD AUTO: 9.71 THOUSANDS/ΜL (ref 1.85–7.62)
NEUTS SEG NFR BLD AUTO: 80 % (ref 43–75)
NRBC BLD AUTO-RTO: 0 /100 WBCS
PLATELET # BLD AUTO: 253 THOUSANDS/UL (ref 149–390)
PMV BLD AUTO: 9.6 FL (ref 8.9–12.7)
POTASSIUM SERPL-SCNC: 3.8 MMOL/L (ref 3.5–5.3)
PROT SERPL-MCNC: 6.5 G/DL (ref 6.4–8.4)
RBC # BLD AUTO: 4.72 MILLION/UL (ref 3.81–5.12)
RH BLD: POSITIVE
SODIUM SERPL-SCNC: 139 MMOL/L (ref 135–147)
SPECIMEN EXPIRATION DATE: NORMAL
WBC # BLD AUTO: 12.17 THOUSAND/UL (ref 4.31–10.16)

## 2024-12-20 PROCEDURE — 86901 BLOOD TYPING SEROLOGIC RH(D): CPT | Performed by: EMERGENCY MEDICINE

## 2024-12-20 PROCEDURE — 85025 COMPLETE CBC W/AUTO DIFF WBC: CPT | Performed by: EMERGENCY MEDICINE

## 2024-12-20 PROCEDURE — 99285 EMERGENCY DEPT VISIT HI MDM: CPT | Performed by: EMERGENCY MEDICINE

## 2024-12-20 PROCEDURE — 71260 CT THORAX DX C+: CPT

## 2024-12-20 PROCEDURE — 72170 X-RAY EXAM OF PELVIS: CPT

## 2024-12-20 PROCEDURE — 73610 X-RAY EXAM OF ANKLE: CPT

## 2024-12-20 PROCEDURE — 36415 COLL VENOUS BLD VENIPUNCTURE: CPT | Performed by: EMERGENCY MEDICINE

## 2024-12-20 PROCEDURE — 73564 X-RAY EXAM KNEE 4 OR MORE: CPT

## 2024-12-20 PROCEDURE — 74177 CT ABD & PELVIS W/CONTRAST: CPT

## 2024-12-20 PROCEDURE — 86900 BLOOD TYPING SEROLOGIC ABO: CPT | Performed by: EMERGENCY MEDICINE

## 2024-12-20 PROCEDURE — 80053 COMPREHEN METABOLIC PANEL: CPT | Performed by: EMERGENCY MEDICINE

## 2024-12-20 PROCEDURE — 99284 EMERGENCY DEPT VISIT MOD MDM: CPT

## 2024-12-20 PROCEDURE — 86850 RBC ANTIBODY SCREEN: CPT | Performed by: EMERGENCY MEDICINE

## 2024-12-20 PROCEDURE — 70450 CT HEAD/BRAIN W/O DYE: CPT

## 2024-12-20 PROCEDURE — 72125 CT NECK SPINE W/O DYE: CPT

## 2024-12-20 RX ORDER — GINSENG 100 MG
1 CAPSULE ORAL ONCE
Status: DISCONTINUED | OUTPATIENT
Start: 2024-12-20 | End: 2024-12-20

## 2024-12-20 RX ORDER — METHOCARBAMOL 500 MG/1
500 TABLET, FILM COATED ORAL 4 TIMES DAILY PRN
Qty: 20 TABLET | Refills: 0 | Status: SHIPPED | OUTPATIENT
Start: 2024-12-20

## 2024-12-20 RX ORDER — ACETAMINOPHEN 325 MG/1
975 TABLET ORAL ONCE
Status: COMPLETED | OUTPATIENT
Start: 2024-12-20 | End: 2024-12-20

## 2024-12-20 RX ORDER — GINSENG 100 MG
1 CAPSULE ORAL ONCE
Status: COMPLETED | OUTPATIENT
Start: 2024-12-20 | End: 2024-12-20

## 2024-12-20 RX ADMIN — ACETAMINOPHEN 975 MG: 325 TABLET, FILM COATED ORAL at 19:12

## 2024-12-20 RX ADMIN — BACITRACIN 1 LARGE APPLICATION: 500 OINTMENT TOPICAL at 19:12

## 2024-12-20 RX ADMIN — IOHEXOL 100 ML: 350 INJECTION, SOLUTION INTRAVENOUS at 19:34

## 2024-12-21 NOTE — ED NOTES
Pt ambulatory to bathroom independently after c-collar cleared by Dr. Edward and aircast applied to L ankle by this RN.     Elisa Burton RN  12/20/24 2035
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
## 2024-12-24 NOTE — TELEPHONE ENCOUNTER
Food log in media HPI:  12/24/24, Time: 1:44 AM DEVON Valentin is a 43 y.o. female presenting to the ED for coughing and short of breath bringing up sputum no headache no stiff neck chest pain only when she coughs no leg swelling or calf pain her D-dimer is not significantly elevated though she is on birth control pills she is not tachycardic she is not hypoxic D-dimer was not elevated, beginning days ago.  The complaint has been persistent, moderate in severity, and worsened by nothing.  She has been drinking fluids urinating normal    ROS:   Pertinent positives and negatives are stated within HPI, all other systems reviewed and are negative.  --------------------------------------------- PAST HISTORY ---------------------------------------------  Past Medical History:  has a past medical history of Acute tonsillitis, COVID, Diverticulitis, History of basal cell cancer, Hypertension, and Kidney stone.    Past Surgical History:  has a past surgical history that includes Knee arthroscopy (Left); [place] tooth extraction (1995); Lithotripsy (1998); Colonoscopy; Endoscopy, colon, diagnostic; Tonsillectomy (N/A, 11/10/2022); Mouth surgery (Right, 11/10/2022); and Cholecystectomy, laparoscopic (N/A, 2/7/2024).    Social History:  reports that she quit smoking about 4 years ago. Her smoking use included cigarettes. She has never used smokeless tobacco. She reports that she does not drink alcohol and does not use drugs.    Family History: family history is not on file.     The patient’s home medications have been reviewed.    Allergies: Patient has no known allergies.    ---------------------------------------------------PHYSICAL EXAM--------------------------------------    Constitutional/General: Alert and oriented x3, well appearing, non toxic in NAD  Head: Normocephalic and atraumatic  Eyes: PERRL, EOMI  Mouth: Oropharynx clear, handling secretions, no trismus  Neck: Supple, full ROM, non tender to palpation in the midline, no

## 2024-12-30 ENCOUNTER — APPOINTMENT (OUTPATIENT)
Age: 34
End: 2024-12-30
Payer: COMMERCIAL

## 2024-12-30 ENCOUNTER — OFFICE VISIT (OUTPATIENT)
Age: 34
End: 2024-12-30
Payer: COMMERCIAL

## 2024-12-30 ENCOUNTER — TELEPHONE (OUTPATIENT)
Age: 34
End: 2024-12-30

## 2024-12-30 DIAGNOSIS — M25.562 LEFT KNEE PAIN, UNSPECIFIED CHRONICITY: Primary | ICD-10-CM

## 2024-12-30 DIAGNOSIS — S89.91XA PCL INJURY, RIGHT, INITIAL ENCOUNTER: ICD-10-CM

## 2024-12-30 DIAGNOSIS — M79.672 PAIN IN LEFT FOOT: ICD-10-CM

## 2024-12-30 DIAGNOSIS — M25.561 RIGHT KNEE PAIN, UNSPECIFIED CHRONICITY: ICD-10-CM

## 2024-12-30 DIAGNOSIS — M25.562 LEFT KNEE PAIN, UNSPECIFIED CHRONICITY: ICD-10-CM

## 2024-12-30 PROCEDURE — 99204 OFFICE O/P NEW MOD 45 MIN: CPT | Performed by: ORTHOPAEDIC SURGERY

## 2024-12-30 PROCEDURE — 73564 X-RAY EXAM KNEE 4 OR MORE: CPT

## 2024-12-30 PROCEDURE — 73630 X-RAY EXAM OF FOOT: CPT

## 2024-12-30 RX ORDER — MELOXICAM 15 MG/1
15 TABLET ORAL DAILY
Qty: 25 TABLET | Refills: 0 | Status: SHIPPED | OUTPATIENT
Start: 2024-12-30

## 2024-12-30 NOTE — TELEPHONE ENCOUNTER
Caller: Patient    Doctor: Best    Reason for call: Questioned if when she wears the PCL brace will she be able to drive? Please cb w/response    Call back#: [phone]
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
## 2025-01-07 ENCOUNTER — HOSPITAL ENCOUNTER (OUTPATIENT)
Dept: MRI IMAGING | Facility: HOSPITAL | Age: 35
Discharge: HOME | End: 2025-01-07
Payer: COMMERCIAL

## 2025-01-07 DIAGNOSIS — S89.91XA PCL INJURY, RIGHT, INITIAL ENCOUNTER: ICD-10-CM

## 2025-01-07 DIAGNOSIS — M25.561 RIGHT KNEE PAIN, UNSPECIFIED CHRONICITY: ICD-10-CM

## 2025-01-07 PROCEDURE — 73721 MRI JNT OF LWR EXTRE W/O DYE: CPT

## 2025-01-13 ENCOUNTER — OFFICE VISIT (OUTPATIENT)
Age: 35
End: 2025-01-13
Payer: COMMERCIAL

## 2025-01-13 DIAGNOSIS — M25.562 LEFT KNEE PAIN, UNSPECIFIED CHRONICITY: ICD-10-CM

## 2025-01-13 DIAGNOSIS — M79.672 PAIN IN LEFT FOOT: ICD-10-CM

## 2025-01-13 DIAGNOSIS — M25.561 RIGHT KNEE PAIN, UNSPECIFIED CHRONICITY: Primary | ICD-10-CM

## 2025-01-13 PROCEDURE — 99213 OFFICE O/P EST LOW 20 MIN: CPT | Performed by: ORTHOPAEDIC SURGERY

## 2025-01-13 NOTE — LETTER
January 13, 2025     Patient: Tiny Fishman  YOB: 1990  Date of Visit: 1/13/2025      To Whom it May Concern:    Tiny Fishman is under my professional care. Tiny was seen in my office on 1/13/2025. Tiny may return to work with limitations . Please allow Tiny to week remotely for the next 6 weeks to allow for further healing and pain control .    If you have any questions or concerns, please don't hesitate to call.         Sincerely,          Sp Plasencia MD        CC: No Recipients
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
## 2025-01-21 ENCOUNTER — EVALUATION (OUTPATIENT)
Dept: PHYSICAL THERAPY | Facility: CLINIC | Age: 35
End: 2025-01-21
Payer: COMMERCIAL

## 2025-01-21 DIAGNOSIS — M25.561 RIGHT KNEE PAIN, UNSPECIFIED CHRONICITY: ICD-10-CM

## 2025-01-21 DIAGNOSIS — M79.672 PAIN IN LEFT FOOT: ICD-10-CM

## 2025-01-21 DIAGNOSIS — M25.562 LEFT KNEE PAIN, UNSPECIFIED CHRONICITY: ICD-10-CM

## 2025-01-21 PROCEDURE — 97162 PT EVAL MOD COMPLEX 30 MIN: CPT

## 2025-01-21 PROCEDURE — 97110 THERAPEUTIC EXERCISES: CPT
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
## 2025-01-27 ENCOUNTER — OFFICE VISIT (OUTPATIENT)
Dept: PHYSICAL THERAPY | Facility: CLINIC | Age: 35
End: 2025-01-27
Payer: COMMERCIAL

## 2025-01-27 DIAGNOSIS — M25.562 LEFT KNEE PAIN, UNSPECIFIED CHRONICITY: Primary | ICD-10-CM

## 2025-01-27 DIAGNOSIS — M25.561 RIGHT KNEE PAIN, UNSPECIFIED CHRONICITY: ICD-10-CM

## 2025-01-27 PROCEDURE — 97140 MANUAL THERAPY 1/> REGIONS: CPT

## 2025-01-27 PROCEDURE — 97110 THERAPEUTIC EXERCISES: CPT

## 2025-01-27 NOTE — PROGRESS NOTES
Daily Note     Today's date: 2025  Patient name: Tiny Fishman  : 1990  MRN: [id number]  Referring provider: Lisa Contreras PA-C  Dx:   Encounter Diagnosis     ICD-10-CM    1. Left knee pain, unspecified chronicity  M25.562       2. Right knee pain, unspecified chronicity  M25.561                      Subjective: Pt reports continued soreness on b/l medial knees and L ankle. Exercises usually increase her soreness      Objective: See treatment diary below      Assessment: Initiated PT POC today. Tolerated treatment well. She had some b/l medial knee tenderness but did not effect her performance of the exercises, some minor discomfort of the L ankle during fwd lunges. Knee distraction and passive ROM of the knees and ankle benefited pt.  Patient demonstrated fatigue post treatment, exhibited good technique with therapeutic exercises, and would benefit from continued PT      Plan: Continue per plan of care.  Progress treatment as tolerated.  Monitor tolerance to her first session     Daily Treatment Diary     Precautions: none  Functional Limitations: walking her dogs 3-4 miles, running 5-6 miles   Impairments: BLE hip strength, BLE knee strength, L ankle DF and inversion ROM, L ankle eversion and inversion strength  [facility] Code: [email or web address]   POC expiration: 3/18/2025  FOTO intake foot/ankle: 62  FOTO intake knee: 57  FOTO status foot/ankle:  FOTO status knee:  FOTO foot/ankle predicted by visit 15: 76  FOTO knee predicted by visit 14: 73      POC Expires Reeval for Medicare to be completed  Unit Limit Auth Expiration Date PT/OT/STVisit Limit   3/18/2025 By visit n/a N/a N/a 60 pcy (backup)                       Auth Status DATE        NA Visit # 1 2        Remaining 59 58       MANUAL THERAPY         L ankle PROM nv 5'       L ankle distraction nv 2'       Cupping to bilateral medial knee/prox tibia?? Nv?        Seated b/l knee distraction   5'                         THERAPEUTIC  "EXERCISE HEP         bike  nv 5 min       bridge 1/21 nv 5\"x15                 Long sit gastroc stretch 1/21 nv 20\"x3 ea       Sidelying hip abd   X10 ea       Prone hip ext 1/21 nv X10 ea                 Standing HR on slant board  nv x20                 squats   x10       Leg press          Fwd lunge   X10 ea       Lat lunge                                        NEUROMUSCULAR REEDUCATION           SLS on foam          Sidestep foam          Tandem walk foam                                                                                THERAPEUTIC ACTIVITY          Fwd step up   6\"x10 ea       Fwd step down          Lat step up   6\"x10 ea                 GAIT TRAINING                                                  MODALITIES                                        "

## 2025-01-30 ENCOUNTER — APPOINTMENT (OUTPATIENT)
Dept: PHYSICAL THERAPY | Facility: CLINIC | Age: 35
End: 2025-01-30
Payer: COMMERCIAL

## 2025-02-03 ENCOUNTER — APPOINTMENT (OUTPATIENT)
Dept: PHYSICAL THERAPY | Facility: CLINIC | Age: 35
End: 2025-02-03
Payer: COMMERCIAL

## 2025-02-05 ENCOUNTER — OFFICE VISIT (OUTPATIENT)
Dept: PHYSICAL THERAPY | Facility: CLINIC | Age: 35
End: 2025-02-05
Payer: COMMERCIAL

## 2025-02-05 DIAGNOSIS — M25.561 RIGHT KNEE PAIN, UNSPECIFIED CHRONICITY: ICD-10-CM

## 2025-02-05 DIAGNOSIS — M25.562 LEFT KNEE PAIN, UNSPECIFIED CHRONICITY: Primary | ICD-10-CM

## 2025-02-05 DIAGNOSIS — M79.672 PAIN IN LEFT FOOT: ICD-10-CM

## 2025-02-05 PROCEDURE — 97110 THERAPEUTIC EXERCISES: CPT

## 2025-02-05 PROCEDURE — 97140 MANUAL THERAPY 1/> REGIONS: CPT

## 2025-02-05 PROCEDURE — 97112 NEUROMUSCULAR REEDUCATION: CPT

## 2025-02-05 NOTE — PROGRESS NOTES
Daily Note     Today's date: 2025  Patient name: Tiny Fishman  : 1990  MRN: [id number]  Referring provider: Lisa Contreras PA-C  Dx:   Encounter Diagnosis     ICD-10-CM    1. Left knee pain, unspecified chronicity  M25.562       2. Right knee pain, unspecified chronicity  M25.561       3. Pain in left foot  M79.672                      Subjective: Pt reports her knees are feeling a little better. Her L ankle is bothering her more. She did walk her dogs the other day for 1.5 miles      Objective: See treatment diary below      Assessment: Patient tolerated treatment well overall. (-) slump test helping to rule out radiculopathy or nerve tension that could be contributing to L ankle pain. She had difficulty with TB ankle eversion - added inversion/eversion with TB for HEP. Initiated more balance on unstable surfaces to progress stability. She will benefit from skilled PT to address impairments and return to PLOF      Plan: progress ankle strength and stability     Daily Treatment Diary     Precautions: none  Functional Limitations: walking her dogs 3-4 miles, running 5-6 miles   Impairments: BLE hip strength, BLE knee strength, L ankle DF and inversion ROM, L ankle eversion and inversion strength  Med[employer] Code: [email or web address]   POC expiration: 3/18/2025  FOTO intake foot/ankle: 62  FOTO intake knee: 57  FOTO status foot/ankle:  FOTO status knee:  FOTO foot/ankle predicted by visit 15: 76  FOTO knee predicted by visit 14: 73      POC Expires Reeval for Medicare to be completed  Unit Limit Auth Expiration Date PT/OT/STVisit Limit   3/18/2025 By visit n/a N/a N/a 60 pcy (backup)                       Auth Status DATE  2/5      NA Visit # 1 2 3       Remaining 59 58 57      MANUAL THERAPY         L ankle PROM nv 5' 6'      L ankle distraction nv 2' 2'      Cupping to bilateral medial knee/prox tibia?? Nv?        Seated b/l knee distraction   5' 5'                        THERAPEUTIC EXERCISE HEP   "       bike  nv 5 min 5'      bridge 1/21 nv 5\"x15 Pball 2x10                Long sit gastroc stretch 1/21 nv 20\"x3 ea 20\"X3 ea      Sidelying hip abd   X10 ea nv      Prone hip ext 1/21 nv X10 ea nv                Standing HR on slant board  nv x20 nv      TB 4 way ankle 2/5 inv/ev   GTB      squats   x10 nv      Leg press          Fwd lunge   X10 ea nv      Lat lunge                                        NEUROMUSCULAR REEDUCATION           SLS on foam    15\"x3      Sidestep foam    3 laps      Tandem walk foam    3 laps                                                                            THERAPEUTIC ACTIVITY          Fwd step up   6\"x10 ea       Fwd step down          Lat step up   6\"x10 ea                 GAIT TRAINING                                                  MODALITIES                                        "

## 2025-02-07 ENCOUNTER — OFFICE VISIT (OUTPATIENT)
Dept: PHYSICAL THERAPY | Facility: CLINIC | Age: 35
End: 2025-02-07
Payer: COMMERCIAL

## 2025-02-07 DIAGNOSIS — M25.562 LEFT KNEE PAIN, UNSPECIFIED CHRONICITY: Primary | ICD-10-CM

## 2025-02-07 DIAGNOSIS — M25.561 RIGHT KNEE PAIN, UNSPECIFIED CHRONICITY: ICD-10-CM

## 2025-02-07 DIAGNOSIS — M79.672 PAIN IN LEFT FOOT: ICD-10-CM

## 2025-02-07 PROCEDURE — 97140 MANUAL THERAPY 1/> REGIONS: CPT

## 2025-02-07 PROCEDURE — 97110 THERAPEUTIC EXERCISES: CPT

## 2025-02-07 NOTE — PROGRESS NOTES
"Daily Note     Today's date: 2025  Patient name: Tiny Fishman  : 1990  MRN: [id number]  Referring provider: Lisa Contreras PA-C  Dx:   Encounter Diagnosis     ICD-10-CM    1. Left knee pain, unspecified chronicity  M25.562       2. Right knee pain, unspecified chronicity  M25.561       3. Pain in left foot  M79.672                      Subjective: Pt reports her ankle has been pretty sore. Her knees haven't bothered her as much       Objective: See treatment diary below      Assessment: Patient tolerated treatment well overall. Decreased resistance for TB ankle exercises - OTB given for HEP. Resumed squats and lunges. Try the leg press next visit. She will benefit from skilled PT to address impairments and return to PLOF      Plan: progress ankle strength and stability     Daily Treatment Diary     Precautions: none  Functional Limitations: walking her dogs 3-4 miles, running 5-6 miles   Impairments: BLE hip strength, BLE knee strength, L ankle DF and inversion ROM, L ankle eversion and inversion strength  MedBridge Code: [email or web address]   POC expiration: 3/18/2025  FOTO intake foot/ankle: 62  FOTO intake knee: 57  FOTO status foot/ankle:  FOTO status knee:  FOTO foot/ankle predicted by visit 15: 76  FOTO knee predicted by visit 14: 73      POC Expires Reeval for Medicare to be completed  Unit Limit Auth Expiration Date PT/OT/STVisit Limit   3/18/2025 By visit n/a N/a N/a 60 pcy (backup)                       Auth Status DATE  2/     NA Visit # 1 2 3 4      Remaining 59 58 57 56     MANUAL THERAPY         L ankle PROM nv 5' 6' 6'     L ankle distraction nv 2' 2' 2'     Cupping to bilateral medial knee/prox tibia?? Nv?        Seated b/l knee distraction   5' 5' np                       THERAPEUTIC EXERCISE HEP         bike  nv 5 min 5' 6'     bridge  nv 5\"x15 Pball 2x10 Pball 2x10               Long sit gastroc stretch  nv 20\"x3 ea 20\"X3 ea 20\"x3     Sidelying hip abd   X10 ea nv  " "    Prone hip ext 1/21 nv X10 ea nv      Standing calf stretch on slant board     30\"x3     Standing HR on slant board  nv x20 nv 20     TB 4 way ankle 2/5 inv/ev   GTB OTB 10ea     squats   x10 nv 2x10     Leg press          Fwd lunge   X10 ea nv 10ea     Lat lunge                                        NEUROMUSCULAR REEDUCATION           SLS on foam    15\"x3 15\"x3 ea green disc      Sidestep foam    3 laps 3 laps     Tandem walk foam    3 laps 3 laps                                                                           THERAPEUTIC ACTIVITY          Fwd step up   6\"x10 ea       Fwd step down          Lat step up   6\"x10 ea                 GAIT TRAINING                                                  MODALITIES                                        "

## 2025-02-10 ENCOUNTER — OFFICE VISIT (OUTPATIENT)
Dept: PHYSICAL THERAPY | Facility: CLINIC | Age: 35
End: 2025-02-10
Payer: COMMERCIAL

## 2025-02-10 DIAGNOSIS — M25.562 LEFT KNEE PAIN, UNSPECIFIED CHRONICITY: Primary | ICD-10-CM

## 2025-02-10 DIAGNOSIS — M25.561 RIGHT KNEE PAIN, UNSPECIFIED CHRONICITY: ICD-10-CM

## 2025-02-10 DIAGNOSIS — M79.672 PAIN IN LEFT FOOT: ICD-10-CM

## 2025-02-10 PROCEDURE — 97112 NEUROMUSCULAR REEDUCATION: CPT

## 2025-02-10 PROCEDURE — 97110 THERAPEUTIC EXERCISES: CPT

## 2025-02-10 PROCEDURE — 97140 MANUAL THERAPY 1/> REGIONS: CPT

## 2025-02-10 NOTE — PROGRESS NOTES
Daily Note     Today's date: 2/10/2025  Patient name: Tiny Fishman  : 1990  MRN: [id number]  Referring provider: Lisa Contreras PA-C  Dx:   Encounter Diagnosis     ICD-10-CM    1. Left knee pain, unspecified chronicity  M25.562       2. Right knee pain, unspecified chronicity  M25.561       3. Pain in left foot  M79.672                      Subjective: Pt reports she is feeling about the same as last visit. Her L ankle is still pretty sore, knees are feeling okay. Pt gets most discomfort with continuous repetitive motion (ie: long walks)      Objective: See treatment diary below      Assessment: Tolerated treatment well. Pt performed all exercises without issue, continue to progress to tolerance. Pt showed good form with exercises, minimal cueing required. Pt responded positively to PROM stretching, reports most of her discomfort is in the lateral side of the L ankle when in inversion. Pt would benefit from continued focus on stretching, strengthening, and stability exercises to improve overall function and decrease pain. Patient demonstrated fatigue post treatment, exhibited good technique with therapeutic exercises, and would benefit from continued PT      Plan: Continue per plan of care.      Daily Treatment Diary     Precautions: none  Functional Limitations: walking her dogs 3-4 miles, running 5-6 miles   Impairments: BLE hip strength, BLE knee strength, L ankle DF and inversion ROM, L ankle eversion and inversion strength  [facility] [email or web address]   POC expiration: 3/18/2025  FOTO intake foot/ankle: 62  FOTO intake knee: 57  FOTO status foot/ankle:  FOTO status knee:  FOTO foot/ankle predicted by visit 15: 76  FOTO knee predicted by visit 14: 73      POC Expires Reeval for Medicare to be completed  Unit Limit Auth Expiration Date PT/OT/STVisit Limit   3/18/2025 By visit n/a N/a N/a 60 pcy (backup)                       Auth Status DATE 1/21 1/27 2/5 2/7 2/10    NA Visit # 1 2 3 4 5      "Remaining 59 58 57 56 55    MANUAL THERAPY         L ankle PROM nv 5' 6' 6' 6'    L ankle distraction nv 2' 2' 2' 2'    Cupping to bilateral medial knee/prox tibia?? Nv?        Seated b/l knee distraction   5' 5' np np                      THERAPEUTIC EXERCISE HEP         bike  nv 5 min 5' 6' 6'    bridge 1/21 nv 5\"x15 Pball 2x10 Pball 2x10 Pball 2x10              Long sit gastroc stretch 1/21 nv 20\"x3 ea 20\"X3 ea 20\"x3 20\"x3    Sidelying hip abd   X10 ea nv      Prone hip ext 1/21 nv X10 ea nv      Standing calf stretch on slant board     30\"x3 30\"x3    Standing HR on slant board  nv x20 nv 20 x20    TB 4 way ankle 2/5 inv/ev   GTB OTB 10ea OTB   10x ea    squats   x10 nv 2x10 2x10    Leg press          Fwd lunge   X10 ea nv 10ea 10 ea    Lat lunge                                        NEUROMUSCULAR REEDUCATION           SLS on foam    15\"x3 15\"x3 ea green disc  15\"x3 ea green disc    Sidestep foam    3 laps 3 laps 3 laps    Tandem walk foam    3 laps 3 laps 3 laps                                                                          THERAPEUTIC ACTIVITY          Fwd step up   6\"x10 ea       Fwd step down          Lat step up   6\"x10 ea                 GAIT TRAINING                                                  MODALITIES                                          "

## 2025-02-13 ENCOUNTER — APPOINTMENT (OUTPATIENT)
Dept: PHYSICAL THERAPY | Facility: CLINIC | Age: 35
End: 2025-02-13
Payer: COMMERCIAL

## 2025-02-17 ENCOUNTER — TELEPHONE (OUTPATIENT)
Age: 35
End: 2025-02-17

## 2025-02-17 ENCOUNTER — OFFICE VISIT (OUTPATIENT)
Dept: PHYSICAL THERAPY | Facility: CLINIC | Age: 35
End: 2025-02-17
Payer: COMMERCIAL

## 2025-02-17 DIAGNOSIS — M25.562 LEFT KNEE PAIN, UNSPECIFIED CHRONICITY: Primary | ICD-10-CM

## 2025-02-17 DIAGNOSIS — M25.561 RIGHT KNEE PAIN, UNSPECIFIED CHRONICITY: ICD-10-CM

## 2025-02-17 PROCEDURE — 97140 MANUAL THERAPY 1/> REGIONS: CPT

## 2025-02-17 PROCEDURE — 97112 NEUROMUSCULAR REEDUCATION: CPT

## 2025-02-17 PROCEDURE — 97110 THERAPEUTIC EXERCISES: CPT

## 2025-02-17 NOTE — PROGRESS NOTES
Daily Note     Today's date: 2025  Patient name: Tiny Fishman  : 1990  MRN: [id number]  Referring provider: Lisa Contreras PA-C  Dx:   Encounter Diagnosis     ICD-10-CM    1. Left knee pain, unspecified chronicity  M25.562       2. Right knee pain, unspecified chronicity  M25.561                      Subjective: Pt reports she had been having more good days than bad days and felt some progression but today her knees and ankle are bothering her more.     Objective: See treatment diary below      Assessment: Tolerated treatment well. She is showing good ankle and knee mobility but still having b/l medial knee soreness and ankle soreness with IV/EV. Still shows ankle weakness with IV/EV. Able to perform TE's without much difficulty but fatigues quick.  Patient demonstrated fatigue post treatment, exhibited good technique with therapeutic exercises, and would benefit from continued PT      Plan: Continue per plan of care.      Daily Treatment Diary     Precautions: none  Functional Limitations: walking her dogs 3-4 miles, running 5-6 miles   Impairments: BLE hip strength, BLE knee strength, L ankle DF and inversion ROM, L ankle eversion and inversion strength  Med[facility] Code: [email or web address]   POC expiration: 3/18/2025  FOTO intake foot/ankle: 62  FOTO intake knee: 57  FOTO status foot/ankle:  FOTO status knee:  FOTO foot/ankle predicted by visit 15: 76  FOTO knee predicted by visit 14: 73      POC Expires Reeval for Medicare to be completed  Unit Limit Auth Expiration Date PT/OT/STVisit Limit   3/18/2025 By visit n/a N/a N/a 60 pcy (backup)                       Auth Status DATE 1/21 1/27 2/5 2/7 2/10 2/17    NA Visit # 1 2 3 4 5 6     Remaining 59 58 57 56 55 54    MANUAL THERAPY          L ankle PROM nv 5' 6' 6' 6' 6'    L ankle distraction nv 2' 2' 2' 2' 3'    Cupping to bilateral medial knee/prox tibia?? Nv?         Seated b/l knee distraction   5' 5' np np 6'                        THERAPEUTIC  "EXERCISE HEP          bike  nv 5 min 5' 6' 6' 6'    bridge 1/21 nv 5\"x15 Pball 2x10 Pball 2x10 Pball 2x10 Pball  10x2               Long sit gastroc stretch 1/21 nv 20\"x3 ea 20\"X3 ea 20\"x3 20\"x3 20\"x3    Sidelying hip abd   X10 ea nv       Prone hip ext 1/21 nv X10 ea nv       Standing calf stretch on slant board     30\"x3 30\"x3 30\"x3    Standing HR on slant board  nv x20 nv 20 x20 20    TB 4 way ankle 2/5 inv/ev   GTB OTB 10ea OTB   10x ea OTB  10x2 ea    squats   x10 nv 2x10 2x10 10x2    Leg press           Fwd lunge   X10 ea nv 10ea 10 ea X10 ea    Lat lunge                                            NEUROMUSCULAR REEDUCATION            SLS on foam    15\"x3 15\"x3 ea green disc  15\"x3 ea green disc 15\"x3 ea blue disc    Sidestep foam    3 laps 3 laps 3 laps 3 laps    Tandem walk foam    3 laps 3 laps 3 laps 3 laps                                                                                 THERAPEUTIC ACTIVITY           Fwd step up   6\"x10 ea        Fwd step down           Lat step up   6\"x10 ea                   GAIT TRAINING                                                       MODALITIES                                             "

## 2025-02-17 NOTE — TELEPHONE ENCOUNTER
Caller: Patient    Doctor/Office: Dr Plasencia    CB#: [phone]    Work note: extension another 2 weeks    Return to work date: 3/10/25    Fax #: none, upload to [employer]    Is there any restrictions that need to be updated? If so, what?     Patient is asking for an extension to work from home for another two weeks.  (For both her knees and ankle)  please advise the patient

## 2025-02-19 ENCOUNTER — OFFICE VISIT (OUTPATIENT)
Dept: PHYSICAL THERAPY | Facility: CLINIC | Age: 35
End: 2025-02-19
Payer: COMMERCIAL

## 2025-02-19 DIAGNOSIS — M25.562 LEFT KNEE PAIN, UNSPECIFIED CHRONICITY: Primary | ICD-10-CM

## 2025-02-19 DIAGNOSIS — M79.672 PAIN IN LEFT FOOT: ICD-10-CM

## 2025-02-19 DIAGNOSIS — M25.561 RIGHT KNEE PAIN, UNSPECIFIED CHRONICITY: ICD-10-CM

## 2025-02-19 PROCEDURE — 97140 MANUAL THERAPY 1/> REGIONS: CPT

## 2025-02-19 PROCEDURE — 97110 THERAPEUTIC EXERCISES: CPT

## 2025-02-19 NOTE — PROGRESS NOTES
Daily Note     Today's date: 2025  Patient name: Tiny Fishman  : 1990  MRN: [id number]  Referring provider: Lisa Contreras PA-C  Dx:   Encounter Diagnosis     ICD-10-CM    1. Left knee pain, unspecified chronicity  M25.562       2. Right knee pain, unspecified chronicity  M25.561       3. Pain in left foot  M79.672                      Subjective: Pt reports not wearing her braces for walking the dogs. 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 Is concerned about walking the hills and stairs from where she sanon to get to the classroom    Objective: See treatment diary below      Assessment: Patient tolerated treatment well overall. Discussed trial of walk/jog program to see how she feels with running. Progressed squats and lunges today with BOSU to also progress stability overall. Added leg press and leg extension machine for strengthening. She will benefit from skilled PT to address impairments and return to PLOF      Plan: Continue per plan of care.      Daily Treatment Diary     Precautions: none  Functional Limitations: walking her dogs 3-4 miles, running 5-6 miles   Impairments: BLE hip strength, BLE knee strength, L ankle DF and inversion ROM, L ankle eversion and inversion strength  [facility] [email or web address]   POC expiration: 3/18/2025  FOTO intake foot/ankle: 62  FOTO intake knee: 57  FOTO status foot/ankle:  FOTO status knee:  FOTO foot/ankle predicted by visit 15: 76  FOTO knee predicted by visit 14: 73      POC Expires Reeval for Medicare to be completed  Unit Limit Auth Expiration Date PT/OT/STVisit Limit   3/18/2025 By visit n/a N/a N/a 60 pcy (backup)                       Auth Status DATE 2/7 2/10 2/17 2/19     NA Visit # 4 5 6 7      Remaining 56 55 54 53     MANUAL THERAPY         L ankle PROM 6' 6' 6' 6'     L ankle distraction 2' 2' 3' 2'     Cupping to bilateral medial knee/prox tibia??         Seated b/l knee distraction  np np 6' 6'                     "   THERAPEUTIC EXERCISE HEP         bike  6' 6' 6' 6'     bridge 1/21 Pball 2x10 Pball 2x10 Pball  10x2 Pball 2x10               Long sit gastroc stretch 1/21 20\"x3 20\"x3 20\"x3 np     Sidelying hip abd          Prone hip ext 1/21         Standing calf stretch on slant board  30\"x3 30\"x3 30\"x3 30\"x3     Standing HR on slant board  20 x20 20 20     TB 4 way ankle 2/5 inv/ev OTB 10ea OTB   10x ea OTB  10x2 ea OTB 2x10ea     squats  2x10 2x10 10x2 2x10 BOSU     Leg press     115# 2x10 BLE     Fwd lunge  10ea 10 ea X10 ea 10ea BOSU     Lat lunge     10 ea BOSU     Leg ext machine BLE     50# 2x10                         NEUROMUSCULAR REEDUCATION           SLS on foam  15\"x3 ea green disc  15\"x3 ea green disc 15\"x3 ea blue disc      Sidestep foam  3 laps 3 laps 3 laps      Tandem walk foam  3 laps 3 laps 3 laps                                                                            THERAPEUTIC ACTIVITY          Fwd step up          Fwd step down          Lat step up                    GAIT TRAINING                                                  MODALITIES                                          "

## 2025-02-24 ENCOUNTER — OFFICE VISIT (OUTPATIENT)
Dept: PHYSICAL THERAPY | Facility: CLINIC | Age: 35
End: 2025-02-24
Payer: COMMERCIAL

## 2025-02-24 DIAGNOSIS — M79.672 PAIN IN LEFT FOOT: ICD-10-CM

## 2025-02-24 DIAGNOSIS — M25.562 LEFT KNEE PAIN, UNSPECIFIED CHRONICITY: Primary | ICD-10-CM

## 2025-02-24 DIAGNOSIS — M25.561 RIGHT KNEE PAIN, UNSPECIFIED CHRONICITY: ICD-10-CM

## 2025-02-24 PROCEDURE — 97110 THERAPEUTIC EXERCISES: CPT

## 2025-02-24 PROCEDURE — 97140 MANUAL THERAPY 1/> REGIONS: CPT

## 2025-02-24 NOTE — PROGRESS NOTES
Daily Note     Today's date: 2025  Patient name: Tiny Fishman  : 1990  MRN: [id number]  Referring provider: Lisa Contreras PA-C  Dx:   Encounter Diagnosis     ICD-10-CM    1. Left knee pain, unspecified chronicity  M25.562       2. Right knee pain, unspecified chronicity  M25.561       3. Pain in left foot  M79.672                      Subjective: Pt reports hat she is doing well overall today noting that she went for a walk with her dogs over the weekend and wore her brace which seemed to help. She did note that her L ankle still bothered her.     Objective: See treatment diary below      Assessment: Patient tolerated treatment well overall. She was able to perform all exercises with no increased pain. Challenged with the lunges into a deeper position but able to complete.  Progressed her leg press and leg extension to end with muscular fatigue noted. She will benefit from skilled PT to address impairments and return to PLOF      Plan: Continue per plan of care.      Daily Treatment Diary     Precautions: none  Functional Limitations: walking her dogs 3-4 miles, running 5-6 miles   Impairments: BLE hip strength, BLE knee strength, L ankle DF and inversion ROM, L ankle eversion and inversion strength  [facility]: [email or web address]   POC expiration: 3/18/2025  FOTO intake foot/ankle: 62  FOTO intake knee: 57  FOTO status foot/ankle:  FOTO status knee:  FOTO foot/ankle predicted by visit 15: 76  FOTO knee predicted by visit 14: 73      POC Expires Reeval for Medicare to be completed  Unit Limit Auth Expiration Date PT/OT/STVisit Limit   3/18/2025 By visit n/a N/a N/a 60 pcy (backup)                       Auth Status DATE 2/7 2/10 2/17 2/19 2/24    NA Visit # 4 5 6 7 8     Remaining 56 55 54 53 52    MANUAL THERAPY         L ankle PROM 6' 6' 6' 6' 6'    L ankle distraction 2' 2' 3' 2' 2'    Cupping to bilateral medial knee/prox tibia??         Seated b/l knee distraction  np np 6' 6' 6'                 "      THERAPEUTIC EXERCISE HEP         bike  6' 6' 6' 6' 6'    bridge 1/21 Pball 2x10 Pball 2x10 Pball  10x2 Pball 2x10 Pball 2x10              Long sit gastroc stretch 1/21 20\"x3 20\"x3 20\"x3 np     Sidelying hip abd          Prone hip ext 1/21         Standing calf stretch on slant board  30\"x3 30\"x3 30\"x3 30\"x3 30\"x3    Standing HR on slant board  20 x20 20 20 20    TB 4 way ankle 2/5 inv/ev OTB 10ea OTB   10x ea OTB  10x2 ea OTB 2x10ea GTB 2x10 ea    squats  2x10 2x10 10x2 2x10 BOSU 2x10 BOSU    Leg press     115# 2x10 # 3x10 BLE    Fwd lunge  10ea 10 ea X10 ea 10ea BOSU 10ea BOSU    Lat lunge     10 ea BOSU 10ea BOSU    Leg ext machine BLE     50# 2x10 50# 3x10                        NEUROMUSCULAR REEDUCATION           SLS on foam  15\"x3 ea green disc  15\"x3 ea green disc 15\"x3 ea blue disc  20\"x3 ea green disc    Sidestep foam  3 laps 3 laps 3 laps      Tandem walk foam  3 laps 3 laps 3 laps                                                                            THERAPEUTIC ACTIVITY          Fwd step up          Fwd step down          Lat step up                    GAIT TRAINING                                                  MODALITIES                                          "

## 2025-02-26 ENCOUNTER — OFFICE VISIT (OUTPATIENT)
Dept: PHYSICAL THERAPY | Facility: CLINIC | Age: 35
End: 2025-02-26
Payer: COMMERCIAL

## 2025-02-26 DIAGNOSIS — M79.672 PAIN IN LEFT FOOT: ICD-10-CM

## 2025-02-26 DIAGNOSIS — M25.561 RIGHT KNEE PAIN, UNSPECIFIED CHRONICITY: ICD-10-CM

## 2025-02-26 DIAGNOSIS — M25.562 LEFT KNEE PAIN, UNSPECIFIED CHRONICITY: Primary | ICD-10-CM

## 2025-02-26 PROCEDURE — 97112 NEUROMUSCULAR REEDUCATION: CPT

## 2025-02-26 PROCEDURE — 97110 THERAPEUTIC EXERCISES: CPT

## 2025-02-26 PROCEDURE — 97140 MANUAL THERAPY 1/> REGIONS: CPT

## 2025-02-26 NOTE — PROGRESS NOTES
Daily Note     Today's date: 2025  Patient name: Tiny Fishman  : 1990  MRN: [id number]  Referring provider: Lisa Contreras PA-C  Dx:   Encounter Diagnosis     ICD-10-CM    1. Left knee pain, unspecified chronicity  M25.562       2. Right knee pain, unspecified chronicity  M25.561       3. Pain in left foot  M79.672                      Subjective: Pt reports she did go on a walk on Monday for about a mile around her neighborhood which does have various inclines    Objective: See treatment diary below      Assessment: Patient tolerated treatment well overall. Progressed squats and forward lunges with round wobble board rather than on BOSU. Added a standing theraband 4 way kick today for hip stability and single leg balance - consider doing this on a foam disc next visit. Will benefit from skilled PT to address impairments and return to PLOF      Plan: assess response to progressions next visit     Daily Treatment Diary     Precautions: none  Functional Limitations: walking her dogs 3-4 miles, running 5-6 miles   Impairments: BLE hip strength, BLE knee strength, L ankle DF and inversion ROM, L ankle eversion and inversion strength  [facility] Code: [email or web address]   POC expiration: 3/18/2025  FOTO intake foot/ankle: 62  FOTO intake knee: 57  FOTO status foot/ankle:  FOTO status knee:  FOTO foot/ankle predicted by visit 15: 76  FOTO knee predicted by visit 14: 73      POC Expires Reeval for Medicare to be completed  Unit Limit Auth Expiration Date PT/OT/STVisit Limit   3/18/2025 By visit n/a N/a N/a 60 pcy (backup)                       Auth Status DATE      NA Visit # 6 7 8 9      Remaining 54 53 52 51     MANUAL THERAPY         L ankle PROM 6' 6' 6' 6'     L ankle distraction 3' 2' 2' 2'     Cupping to bilateral medial knee/prox tibia??         Seated b/l knee distraction  6' 6' 6'                        THERAPEUTIC EXERCISE HEP         bike  6' 6' 6' 6'     bridge  Pball  10x2  "Pball 2x10 Pball 2x10 Pball 2x10               Long sit gastroc stretch 1/21 20\"x3 np       Sidelying hip abd          Prone hip ext 1/21         Standing calf stretch on slant board  30\"x3 30\"x3 30\"x3 20\"x3     Standing HR on slant board  20 20 20 20     TB 4 way ankle 2/5 inv/ev OTB  10x2 ea OTB 2x10ea GTB 2x10 ea GTB 2x10 ea     squats  10x2 2x10 BOSU 2x10 BOSU 2x10 round wobble board     Leg press   115# 2x10 # 3x10 BLE      Fwd lunge  X10 ea 10ea BOSU 10ea BOSU 10ea round wobble board     Lat lunge   10 ea BOSU 10ea BOSU No BOSU 10ea     Leg ext machine BLE   50# 2x10 50# 3x10 50# 3x10                         NEUROMUSCULAR REEDUCATION           SLS on foam  15\"x3 ea blue disc  20\"x3 ea green disc 20\"x3 wa green disc     Sidestep foam  3 laps        Tandem walk foam  3 laps        TB 4 way kick     GTB 10ea                                                                 THERAPEUTIC ACTIVITY          Fwd step up          Fwd step down          Lat step up                    GAIT TRAINING                                                  MODALITIES                                          "

## 2025-03-21 ENCOUNTER — TELEPHONE (OUTPATIENT)
Age: 35
End: 2025-03-21

## 2025-03-21 NOTE — TELEPHONE ENCOUNTER
Left message for patient to call me back DIRECTLY to REschedule.  Left my DIRECT phone # 2x on message.

## 2025-03-24 ENCOUNTER — OFFICE VISIT (OUTPATIENT)
Age: 35
End: 2025-03-24
Payer: COMMERCIAL

## 2025-03-24 VITALS — HEIGHT: 61 IN | BODY MASS INDEX: 27.38 KG/M2 | WEIGHT: 145 LBS

## 2025-03-24 DIAGNOSIS — M79.672 PAIN IN LEFT FOOT: Primary | ICD-10-CM

## 2025-03-24 PROCEDURE — 99213 OFFICE O/P EST LOW 20 MIN: CPT | Performed by: ORTHOPAEDIC SURGERY
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
## 2025-03-31 ENCOUNTER — HOSPITAL ENCOUNTER (OUTPATIENT)
Dept: RADIOLOGY | Facility: HOSPITAL | Age: 35
Discharge: HOME | End: 2025-03-31
Payer: COMMERCIAL

## 2025-03-31 DIAGNOSIS — M79.672 PAIN IN LEFT FOOT: ICD-10-CM

## 2025-03-31 PROCEDURE — 73721 MRI JNT OF LWR EXTRE W/O DYE: CPT

## 2025-04-03 ENCOUNTER — RESULTS FOLLOW-UP (OUTPATIENT)
Age: 35
End: 2025-04-03

## 2025-04-07 ENCOUNTER — OFFICE VISIT (OUTPATIENT)
Age: 35
End: 2025-04-07
Payer: COMMERCIAL

## 2025-04-07 DIAGNOSIS — S90.32XA CONTUSION OF LEFT FOOT, INITIAL ENCOUNTER: Primary | ICD-10-CM

## 2025-04-07 PROCEDURE — 99213 OFFICE O/P EST LOW 20 MIN: CPT | Performed by: ORTHOPAEDIC SURGERY
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
## 2025-08-04 ENCOUNTER — TELEPHONE (OUTPATIENT)
Dept: OTHER | Facility: HOSPITAL | Age: 35
End: 2025-08-04

## 2025-08-04 RX ORDER — SERTRALINE HYDROCHLORIDE 100 MG/1
100 TABLET, FILM COATED ORAL DAILY
Qty: 90 TABLET | Refills: 1 | Status: CANCELLED | OUTPATIENT
Start: 2025-08-04

## 2025-08-06 DIAGNOSIS — F41.1 GAD (GENERALIZED ANXIETY DISORDER): Primary | ICD-10-CM

## 2025-08-06 RX ORDER — SERTRALINE HYDROCHLORIDE 100 MG/1
100 TABLET, FILM COATED ORAL DAILY
Qty: 30 TABLET | Refills: 0 | Status: SHIPPED | OUTPATIENT
Start: 2025-08-06